# Patient Record
Sex: FEMALE | Race: WHITE | NOT HISPANIC OR LATINO | Employment: FULL TIME | URBAN - METROPOLITAN AREA
[De-identification: names, ages, dates, MRNs, and addresses within clinical notes are randomized per-mention and may not be internally consistent; named-entity substitution may affect disease eponyms.]

---

## 2017-04-20 ENCOUNTER — GENERIC CONVERSION - ENCOUNTER (OUTPATIENT)
Dept: OTHER | Facility: OTHER | Age: 57
End: 2017-04-20

## 2018-01-15 VITALS
DIASTOLIC BLOOD PRESSURE: 64 MMHG | HEART RATE: 68 BPM | TEMPERATURE: 98.4 F | BODY MASS INDEX: 24.32 KG/M2 | WEIGHT: 146 LBS | SYSTOLIC BLOOD PRESSURE: 114 MMHG | RESPIRATION RATE: 18 BRPM | HEIGHT: 65 IN | OXYGEN SATURATION: 98 %

## 2021-03-30 ENCOUNTER — OFFICE VISIT (OUTPATIENT)
Dept: PODIATRY | Facility: CLINIC | Age: 61
End: 2021-03-30
Payer: COMMERCIAL

## 2021-03-30 VITALS — HEIGHT: 66 IN | WEIGHT: 150 LBS | RESPIRATION RATE: 17 BRPM | BODY MASS INDEX: 24.11 KG/M2

## 2021-03-30 DIAGNOSIS — M21.962 ACQUIRED DEFORMITY OF LEFT FOOT: ICD-10-CM

## 2021-03-30 DIAGNOSIS — B35.1 ONYCHOMYCOSIS: ICD-10-CM

## 2021-03-30 DIAGNOSIS — Q82.8 KERATOSIS PALMARIS ET PLANTARIS: Primary | ICD-10-CM

## 2021-03-30 PROCEDURE — 99203 OFFICE O/P NEW LOW 30 MIN: CPT | Performed by: PODIATRIST

## 2021-03-30 PROCEDURE — 73620 X-RAY EXAM OF FOOT: CPT | Performed by: PODIATRIST

## 2021-03-30 RX ORDER — TERBINAFINE HYDROCHLORIDE 250 MG/1
250 TABLET ORAL DAILY
Qty: 30 TABLET | Refills: 0 | Status: SHIPPED | OUTPATIENT
Start: 2021-03-30 | End: 2021-04-29

## 2021-03-30 RX ORDER — LEVOTHYROXINE SODIUM 137 UG/1
TABLET ORAL
COMMUNITY

## 2021-03-30 RX ORDER — TERBINAFINE HYDROCHLORIDE 250 MG/1
250 TABLET ORAL DAILY
Qty: 30 TABLET | Refills: 0 | Status: SHIPPED | OUTPATIENT
Start: 2021-03-30 | End: 2021-03-30

## 2021-03-30 RX ORDER — THYROID,PORK 90 MG
TABLET ORAL
COMMUNITY
Start: 2021-02-08

## 2021-03-30 NOTE — PROGRESS NOTES
Assessment/Plan: cornified lesion proximal nail fold 4th left toe  Negative Guthrie sign  Deformity of toe  Mycosis of right hallux nail     plan  Foot exam performed  Patient educated on condition  X-rays taken  We will schedule excision of biopsy of lesion of left foot  We will start the patient on Lamisil         Diagnoses and all orders for this visit:    Keratosis palmaris et plantaris    Acquired deformity of left foot    Onychomycosis  -     terbinafine (LamISIL) 250 mg tablet; Take 1 tablet (250 mg total) by mouth daily    Other orders  -     levothyroxine (Synthroid) 137 mcg tablet; Take by mouth  -     [place] Thyroid 90 MG tablet          Subjective:  Patient has 2 complaints  She has a skin lesion on her 4th left toe  It has been there for quite some time  No history of trauma  She is also concerned with discoloration of right big toe  No Known Allergies      Current Outpatient Medications:     [place] Thyroid 90 MG tablet, , Disp: , Rfl:     levothyroxine (Synthroid) 137 mcg tablet, Take by mouth, Disp: , Rfl:     terbinafine (LamISIL) 250 mg tablet, Take 1 tablet (250 mg total) by mouth daily, Disp: 30 tablet, Rfl: 0    There is no problem list on file for this patient  Patient ID: Clement Garcia is a 64 y o  female  HPI    The following portions of the patient's history were reviewed and updated as appropriate:     family history is not on file  has no history on file for tobacco, alcohol, and drug  Vitals:    03/30/21 1355   Resp: 17       Review of Systems      Objective:  Patient's shoes and socks removed     Foot Exam    General  General Appearance: appears stated age and healthy   Orientation: alert and oriented to person, place, and time   Affect: appropriate   Gait: unimpaired       Right Foot/Ankle     Inspection and Palpation  Swelling: dorsum   Arch: pes planus  Hallux valgus: yes    Neurovascular  Dorsalis pedis: 3+  Posterior tibial: 3+      Left Foot/Ankle      Inspection and Palpation  Tenderness: bony tenderness   Swelling: dorsum   Arch: pes planus  Hallux valgus: yes  Skin Exam: skin changes; Neurovascular  Dorsalis pedis: 3+  Posterior tibial: 3+        Physical Exam  Vitals signs and nursing note reviewed  Constitutional:       Appearance: Normal appearance  Cardiovascular:      Rate and Rhythm: Normal rate and regular rhythm  Pulses:           Dorsalis pedis pulses are 3+ on the right side and 3+ on the left side  Posterior tibial pulses are 3+ on the right side and 3+ on the left side  Musculoskeletal:      Right foot: Bunion present  Left foot: Bony tenderness and bunion present  Skin:     Comments:   Right hallux demonstrates dried subungual hematoma early mycosis  Negative lysis  4th left toe in the area the proximal nail fold demonstrates a cornified skin lesion  Negative Guthrie sign noted  Negative pigmentation of nail  X-ray demonstrates no evidence of exostosis   Neurological:      Mental Status: She is alert  Psychiatric:         Mood and Affect: Mood normal          Behavior: Behavior normal          Thought Content:  Thought content normal          Judgment: Judgment normal

## 2021-04-02 DIAGNOSIS — B35.1 ONYCHOMYCOSIS: Primary | ICD-10-CM

## 2021-04-02 RX ORDER — TERBINAFINE HYDROCHLORIDE 250 MG/1
250 TABLET ORAL DAILY
Qty: 30 TABLET | Refills: 0 | Status: SHIPPED | OUTPATIENT
Start: 2021-04-02 | End: 2021-05-02

## 2021-04-12 ENCOUNTER — PROCEDURE VISIT (OUTPATIENT)
Dept: PODIATRY | Facility: CLINIC | Age: 61
End: 2021-04-12
Payer: COMMERCIAL

## 2021-04-12 VITALS
BODY MASS INDEX: 24.11 KG/M2 | SYSTOLIC BLOOD PRESSURE: 123 MMHG | HEART RATE: 81 BPM | DIASTOLIC BLOOD PRESSURE: 84 MMHG | HEIGHT: 66 IN | WEIGHT: 150 LBS | RESPIRATION RATE: 16 BRPM

## 2021-04-12 DIAGNOSIS — Q82.8 KERATOSIS PALMARIS ET PLANTARIS: Primary | ICD-10-CM

## 2021-04-12 PROCEDURE — 11103 TANGNTL BX SKIN EA SEP/ADDL: CPT | Performed by: PODIATRIST

## 2021-04-12 NOTE — PROGRESS NOTES
General  General Appearance: appears stated age and healthy   Orientation: alert and oriented to person, place, and time   Affect: appropriate   Gait: unimpaired         Right Foot/Ankle      Inspection and Palpation  Swelling: dorsum   Arch: pes planus  Hallux valgus: yes     Neurovascular  Dorsalis pedis: 3+  Posterior tibial: 3+        Left Foot/Ankle       Inspection and Palpation  Tenderness: bony tenderness   Swelling: dorsum   Arch: pes planus  Hallux valgus: yes  Skin Exam: skin changes;      Neurovascular  Dorsalis pedis: 3+  Posterior tibial: 3+           Physical Exam  Vitals signs and nursing note reviewed  Constitutional:       Appearance: Normal appearance  Cardiovascular:      Rate and Rhythm: Normal rate and regular rhythm  Pulses:           Dorsalis pedis pulses are 3+ on the right side and 3+ on the left side  Posterior tibial pulses are 3+ on the right side and 3+ on the left side  Musculoskeletal:      Right foot: Bunion present  Left foot: Bony tenderness and bunion present  Skin:  Proximal nail fold 4th left toe demonstrates cornified skin lesion  This is apparent keratosis  Plan  Excision and biopsy lesion to be performed  After achieving anesthesia with 3 cc 2% lidocaine plain as a digital nerve block of the 4th left toe, lesion was excised in total   Curette was used to perform this procedure  Base was cauterized with both phenol and silver nitrate  Prior to procedure patient was advised on condition  Consent form has been signed  Patient is aware the that nail deformity could occur after procedure  However she would like to proceed  She has been given written instruction for care and she will take Tylenol lee Pineda

## 2021-04-13 DIAGNOSIS — Z23 ENCOUNTER FOR IMMUNIZATION: ICD-10-CM

## 2021-04-26 ENCOUNTER — OFFICE VISIT (OUTPATIENT)
Dept: PODIATRY | Facility: CLINIC | Age: 61
End: 2021-04-26
Payer: COMMERCIAL

## 2021-04-26 VITALS
DIASTOLIC BLOOD PRESSURE: 84 MMHG | RESPIRATION RATE: 16 BRPM | HEIGHT: 66 IN | SYSTOLIC BLOOD PRESSURE: 123 MMHG | WEIGHT: 150 LBS | BODY MASS INDEX: 24.11 KG/M2

## 2021-04-26 DIAGNOSIS — M67.40 MUCOID CYST OF JOINT: ICD-10-CM

## 2021-04-26 DIAGNOSIS — M21.962 ACQUIRED DEFORMITY OF LEFT FOOT: ICD-10-CM

## 2021-04-26 DIAGNOSIS — M21.41 ACQUIRED FLAT FOOT, RIGHT: ICD-10-CM

## 2021-04-26 DIAGNOSIS — M21.42 ACQUIRED FLAT FOOT, LEFT: ICD-10-CM

## 2021-04-26 DIAGNOSIS — M21.961 ACQUIRED DEFORMITY OF RIGHT FOOT: Primary | ICD-10-CM

## 2021-04-26 DIAGNOSIS — B35.1 ONYCHOMYCOSIS: ICD-10-CM

## 2021-04-26 PROCEDURE — 99212 OFFICE O/P EST SF 10 MIN: CPT | Performed by: PODIATRIST

## 2021-04-26 PROCEDURE — L3000 FT INSERT UCB BERKELEY SHELL: HCPCS | Performed by: PODIATRIST

## 2021-04-26 NOTE — PROGRESS NOTES
Assessment/Plan: deformity of foot bilateral   Hammertoe formation  Acquired pes planus  Mucoid cyst right toe  Cornified lesion left toe  Awaiting biopsy results  Pain upon ambulation  Mycosis of nail,  Limb length discrepancy  plan  Foot exam performed  Patient educated on condition  Wound of full 4th left toe addressed  Patient remain on Lamisil  Mucoid cyst right toe debrided and cauterized  Patient's feet casted for custom molded orthotics         Diagnoses and all orders for this visit:    Acquired deformity of right foot    Acquired deformity of left foot    Acquired flat foot, right    Acquired flat foot, left    Mucoid cyst of joint          Subjective:   Patient is doing well status post procedure 4th left toe  She now has a concern about 4th right toe cyst   She is on Lamisil without problems  No Known Allergies      Current Outpatient Medications:     [place] Thyroid 90 MG tablet, , Disp: , Rfl:     levothyroxine (Synthroid) 137 mcg tablet, Take by mouth, Disp: , Rfl:     terbinafine (LamISIL) 250 mg tablet, Take 1 tablet (250 mg total) by mouth daily, Disp: 30 tablet, Rfl: 0    terbinafine (LamISIL) 250 mg tablet, Take 1 tablet (250 mg total) by mouth daily, Disp: 30 tablet, Rfl: 0    There is no problem list on file for this patient  Patient ID: Paul Magallanes is a 64 y o  female  HPI    The following portions of the patient's history were reviewed and updated as appropriate:     family history is not on file  has no history on file for tobacco, alcohol, and drug  Vitals:    04/26/21 1443   BP: 123/84   Resp: 16       Review of Systems      Objective:  Patient's shoes and socks removed     Foot ExamPhysical Exam         General  General Appearance: appears stated age and healthy   Orientation: alert and oriented to person, place, and time   Affect: appropriate   Gait: unimpaired         Right Foot/Ankle      Inspection and Palpation  Swelling: dorsum   Arch: pes planus  Hallux valgus: yes     Neurovascular  Dorsalis pedis: 3+  Posterior tibial: 3+        Left Foot/Ankle       Inspection and Palpation  Tenderness: bony tenderness   Swelling: dorsum   Arch: pes planus  Hallux valgus: yes  Skin Exam: skin changes;      Neurovascular  Dorsalis pedis: 3+  Posterior tibial: 3+           Physical Exam  Vitals signs and nursing note reviewed  Constitutional:       Appearance: Normal appearance  Cardiovascular:      Rate and Rhythm: Normal rate and regular rhythm  Pulses:           Dorsalis pedis pulses are 3+ on the right side and 3+ on the left side  Posterior tibial pulses are 3+ on the right side and 3+ on the left side  Musculoskeletal:      Right foot: Bunion present  Left foot: Bony tenderness and bunion present  Skin:     Comments:   Right hallux demonstrates dried subungual hematoma early mycosis  Negative lysis  4th left toe in the area the proximal nail fold demonstrates a cornified skin lesion  Negative Guthrie sign noted  Negative pigmentation of nail  X-ray demonstrates no evidence of exostosis    This area now is healing well status post biopsy  No evidence of cellulitis      DIPJ 4th right toe demonstrates small mucoid cyst   Incision and drainage done  Gel noted  No evidence of infection  Neurological:      Mental Status: She is alert  Psychiatric:         Mood and Affect: Mood normal          Behavior: Behavior normal          Thought Content:  Thought content normal          Judgment: Judgment normal

## 2021-04-27 ENCOUNTER — TELEPHONE (OUTPATIENT)
Dept: PODIATRY | Facility: CLINIC | Age: 61
End: 2021-04-27

## 2021-04-27 LAB
PATH REPORT.SITE OF ORIGIN SPEC: NORMAL
PAYMENT PROCEDURE: NORMAL
SL AMB .: NORMAL

## 2021-04-27 NOTE — TELEPHONE ENCOUNTER
Patient advised of results  She will read turn within 1 month for evaluation    She will watch for signs of infection

## 2021-05-27 ENCOUNTER — OFFICE VISIT (OUTPATIENT)
Dept: PODIATRY | Facility: CLINIC | Age: 61
End: 2021-05-27
Payer: COMMERCIAL

## 2021-05-27 VITALS — RESPIRATION RATE: 17 BRPM | WEIGHT: 150 LBS | HEIGHT: 66 IN | BODY MASS INDEX: 24.11 KG/M2

## 2021-05-27 DIAGNOSIS — M67.40 MUCOID CYST OF JOINT: Primary | ICD-10-CM

## 2021-05-27 DIAGNOSIS — B35.1 ONYCHOMYCOSIS: ICD-10-CM

## 2021-05-27 PROCEDURE — 99213 OFFICE O/P EST LOW 20 MIN: CPT | Performed by: PODIATRIST

## 2021-05-27 RX ORDER — TERBINAFINE HYDROCHLORIDE 250 MG/1
250 TABLET ORAL DAILY
Qty: 30 TABLET | Refills: 0 | Status: SHIPPED | OUTPATIENT
Start: 2021-05-27 | End: 2021-11-23

## 2021-05-27 NOTE — PROGRESS NOTES
Assessment/Plan:   pain upon ambulation  Mucoid cyst 4th right toe  Resolving mycosis of nail  Plan  Foot exam performed  Patient educated on condition  Right 4th toe lesion debrided  Silver nitrate applied  Patient remain on oral terbinafine  [facility] ordered  Diagnoses and all orders for this visit:    Mucoid cyst of joint    Onychomycosis  -     terbinafine (LamISIL) 250 mg tablet; Take 1 tablet (250 mg total) by mouth daily          Subjective:   Patient presents for evaluation  She is doing better  She is taking Lamisil without problem  She has a small cyst developing on her 4th right toe  No Known Allergies      Current Outpatient Medications:     [place] Thyroid 90 MG tablet, , Disp: , Rfl:     levothyroxine (Synthroid) 137 mcg tablet, Take by mouth, Disp: , Rfl:     terbinafine (LamISIL) 250 mg tablet, Take 1 tablet (250 mg total) by mouth daily, Disp: 30 tablet, Rfl: 0    There is no problem list on file for this patient  Patient ID: Alejandra Farmer is a 64 y o  female  HPI    The following portions of the patient's history were reviewed and updated as appropriate:     family history is not on file  has no history on file for tobacco, alcohol, and drug  Vitals:    05/27/21 1557   Resp: 17       Review of Systems      Objective:  Patient's shoes and socks removed     Foot ExamPhysical Exam         General  General Appearance: appears stated age and healthy   Orientation: alert and oriented to person, place, and time   Affect: appropriate   Gait: unimpaired         Right Foot/Ankle      Inspection and Palpation  Swelling: dorsum   Arch: pes planus  Hallux valgus: yes     Neurovascular  Dorsalis pedis: 3+  Posterior tibial: 3+        Left Foot/Ankle       Inspection and Palpation  Tenderness: bony tenderness   Swelling: dorsum   Arch: pes planus  Hallux valgus: yes  Skin Exam: skin changes;      Neurovascular  Dorsalis pedis: 3+  Posterior tibial: 3+           Physical Exam  Vitals signs and nursing note reviewed  Constitutional:       Appearance: Normal appearance  Cardiovascular:      Rate and Rhythm: Normal rate and regular rhythm       Pulses:           Dorsalis pedis pulses are 3+ on the right side and 3+ on the left side         Posterior tibial pulses are 3+ on the right side and 3+ on the left side  Musculoskeletal:      Right foot: Bunion present       Left foot: Bony tenderness and bunion present  Skin:     Comments:   Right hallux demonstrates dried subungual hematoma early mycosis   Negative lysis         DIPJ 4th right toe demonstrates small mucoid cyst   Incision and drainage done  Gel noted  No evidence of infection        Neurological:      Mental Status: She is alert  Psychiatric:         Mood and Affect: Mood normal          BehaviorAtlas Aravind         Thought Content:  Thought content normal          Judgment: Judgment normal

## 2021-10-05 ENCOUNTER — OFFICE VISIT (OUTPATIENT)
Dept: PODIATRY | Facility: CLINIC | Age: 61
End: 2021-10-05
Payer: COMMERCIAL

## 2021-10-05 VITALS — RESPIRATION RATE: 16 BRPM | HEIGHT: 66 IN | BODY MASS INDEX: 24.11 KG/M2 | WEIGHT: 150 LBS

## 2021-10-05 DIAGNOSIS — M21.961 ACQUIRED DEFORMITY OF BOTH FEET: ICD-10-CM

## 2021-10-05 DIAGNOSIS — B35.1 ONYCHOMYCOSIS: ICD-10-CM

## 2021-10-05 DIAGNOSIS — M67.40 MUCOID CYST OF JOINT: Primary | ICD-10-CM

## 2021-10-05 DIAGNOSIS — M21.962 ACQUIRED DEFORMITY OF BOTH FEET: ICD-10-CM

## 2021-10-05 PROCEDURE — 99213 OFFICE O/P EST LOW 20 MIN: CPT | Performed by: PODIATRIST

## 2021-10-06 LAB
ALBUMIN SERPL-MCNC: 4.6 G/DL (ref 3.8–4.9)
ALBUMIN/GLOB SERPL: 2.4 {RATIO} (ref 1.2–2.2)
ALP SERPL-CCNC: 59 IU/L (ref 44–121)
ALT SERPL-CCNC: 15 IU/L (ref 0–32)
AST SERPL-CCNC: 17 IU/L (ref 0–40)
BILIRUB SERPL-MCNC: 0.2 MG/DL (ref 0–1.2)
BUN SERPL-MCNC: 10 MG/DL (ref 6–24)
BUN/CREAT SERPL: 18 (ref 9–23)
CALCIUM SERPL-MCNC: 9.6 MG/DL (ref 8.7–10.2)
CHLORIDE SERPL-SCNC: 98 MMOL/L (ref 96–106)
CO2 SERPL-SCNC: 26 MMOL/L (ref 20–29)
CREAT SERPL-MCNC: 0.55 MG/DL (ref 0.57–1)
GLOBULIN SER-MCNC: 1.9 G/DL (ref 1.5–4.5)
GLUCOSE SERPL-MCNC: 100 MG/DL (ref 65–99)
POTASSIUM SERPL-SCNC: 4.3 MMOL/L (ref 3.5–5.2)
PROT SERPL-MCNC: 6.5 G/DL (ref 6–8.5)
SL AMB EGFR AFRICAN AMERICAN: 119 ML/MIN/1.73
SL AMB EGFR NON AFRICAN AMERICAN: 103 ML/MIN/1.73
SODIUM SERPL-SCNC: 136 MMOL/L (ref 134–144)

## 2021-11-23 DIAGNOSIS — B35.1 ONYCHOMYCOSIS: ICD-10-CM

## 2021-11-23 RX ORDER — TERBINAFINE HYDROCHLORIDE 250 MG/1
TABLET ORAL
Qty: 24 TABLET | Refills: 0 | Status: SHIPPED | OUTPATIENT
Start: 2021-11-23 | End: 2021-11-23 | Stop reason: SDUPTHER

## 2021-11-23 RX ORDER — TERBINAFINE HYDROCHLORIDE 250 MG/1
250 TABLET ORAL DAILY
Qty: 90 TABLET | Refills: 0 | Status: SHIPPED | OUTPATIENT
Start: 2021-11-23 | End: 2021-12-07 | Stop reason: SDUPTHER

## 2021-12-07 ENCOUNTER — OFFICE VISIT (OUTPATIENT)
Dept: PODIATRY | Facility: CLINIC | Age: 61
End: 2021-12-07
Payer: COMMERCIAL

## 2021-12-07 ENCOUNTER — IMMUNIZATIONS (OUTPATIENT)
Dept: FAMILY MEDICINE CLINIC | Facility: HOSPITAL | Age: 61
End: 2021-12-07

## 2021-12-07 VITALS — BODY MASS INDEX: 24.11 KG/M2 | HEIGHT: 66 IN | WEIGHT: 150 LBS | RESPIRATION RATE: 17 BRPM

## 2021-12-07 DIAGNOSIS — B35.1 ONYCHOMYCOSIS: ICD-10-CM

## 2021-12-07 DIAGNOSIS — M21.962 ACQUIRED DEFORMITY OF BOTH FEET: ICD-10-CM

## 2021-12-07 DIAGNOSIS — M21.961 ACQUIRED DEFORMITY OF BOTH FEET: ICD-10-CM

## 2021-12-07 DIAGNOSIS — M21.961 ACQUIRED DEFORMITY OF RIGHT FOOT: ICD-10-CM

## 2021-12-07 DIAGNOSIS — Z23 ENCOUNTER FOR IMMUNIZATION: Primary | ICD-10-CM

## 2021-12-07 DIAGNOSIS — M67.40 MUCOID CYST OF JOINT: Primary | ICD-10-CM

## 2021-12-07 PROCEDURE — 91306 COVID-19 MODERNA VACC 0.25 ML BOOSTER: CPT

## 2021-12-07 PROCEDURE — 99213 OFFICE O/P EST LOW 20 MIN: CPT | Performed by: PODIATRIST

## 2021-12-07 PROCEDURE — 0064A COVID-19 MODERNA VACC 0.25 ML BOOSTER: CPT

## 2021-12-07 RX ORDER — TERBINAFINE HYDROCHLORIDE 250 MG/1
250 TABLET ORAL DAILY
Qty: 90 TABLET | Refills: 0 | Status: SHIPPED | OUTPATIENT
Start: 2021-12-07 | End: 2021-12-07 | Stop reason: SDUPTHER

## 2021-12-07 RX ORDER — TERBINAFINE HYDROCHLORIDE 250 MG/1
250 TABLET ORAL DAILY
Qty: 30 TABLET | Refills: 0 | Status: SHIPPED | OUTPATIENT
Start: 2021-12-07 | End: 2022-01-06

## 2022-02-08 ENCOUNTER — OFFICE VISIT (OUTPATIENT)
Dept: PODIATRY | Facility: CLINIC | Age: 62
End: 2022-02-08
Payer: COMMERCIAL

## 2022-02-08 VITALS — BODY MASS INDEX: 24.11 KG/M2 | WEIGHT: 150 LBS | HEIGHT: 66 IN | RESPIRATION RATE: 16 BRPM

## 2022-02-08 DIAGNOSIS — M67.40 MUCOID CYST OF JOINT: Primary | ICD-10-CM

## 2022-02-08 DIAGNOSIS — B35.1 ONYCHOMYCOSIS: ICD-10-CM

## 2022-02-08 DIAGNOSIS — M21.962 ACQUIRED DEFORMITY OF BOTH FEET: ICD-10-CM

## 2022-02-08 DIAGNOSIS — M21.961 ACQUIRED DEFORMITY OF RIGHT FOOT: ICD-10-CM

## 2022-02-08 DIAGNOSIS — M21.961 ACQUIRED DEFORMITY OF BOTH FEET: ICD-10-CM

## 2022-02-08 PROCEDURE — 99213 OFFICE O/P EST LOW 20 MIN: CPT | Performed by: PODIATRIST

## 2022-02-08 NOTE — PROGRESS NOTES
Assessment/Plan:   pain upon ambulation   Mucoid cyst 4th right and left toe    mycosis of nail  Acquired deformity of foot       Plan   Foot exam performed   Patient educated on condition   Left and  Right 4th toe lesion debrided   Gentian violet applied  Silver nitrate applied   Patient may need surgical excision of lesions   CMP ordered    refill of Lamisil ordered         Diagnoses and all orders for this visit:     Mucoid cyst of joint , 4th toe bilateral      Onychomycosis      acquired deformity of foot bilateral                Subjective:   Patient presents for evaluation   She is doing better  Iris Rodriguescecille is taking Lamisil without problem   She has a small cyst developing on her 4th right toe      No Known Allergies        Current Outpatient Medications:     [place] Thyroid 90 MG tablet, , Disp: , Rfl:     levothyroxine (Synthroid) 137 mcg tablet, Take by mouth, Disp: , Rfl:     terbinafine (LamISIL) 250 mg tablet, Take 1 tablet (250 mg total) by mouth daily, Disp: 30 tablet, Rfl: 0     There is no problem list on file for this patient             Patient ID: Florina Storm is a 61 y  o  female      HPI     The following portions of the patient's history were reviewed and updated as appropriate:      family history is not on file        has no history on file for tobacco, alcohol, and drug         Objective:  Patient's shoes removed  By patient          Foot ExamPhysical Exam          General  General Appearance: appears stated age and healthy   Orientation: alert and oriented to person, place, and time   Affect: appropriate   Gait: unimpaired         Right Foot/Ankle      Inspection and Palpation  Swelling: dorsum   Arch: pes planus  Hallux valgus: yes     Neurovascular  Dorsalis pedis: 3+  Posterior tibial: 3+        Left Foot/Ankle       Inspection and Palpation  Tenderness: bony tenderness   Swelling: dorsum   Arch: pes planus  Hallux valgus: yes  Skin Exam: skin changes;      Neurovascular  Dorsalis pedis: 3+  Posterior tibial: 3+           Physical Exam  Vitals signs and nursing note reviewed  Constitutional:       Appearance: Normal appearance  Cardiovascular:      Rate and Rhythm: Normal rate and regular rhythm       Pulses:           Dorsalis pedis pulses are 3+ on the right side and 3+ on the left side         Posterior tibial pulses are 3+ on the right side and 3+ on the left side  Musculoskeletal:      Right foot: Bunion present       Left foot: Bony tenderness and bunion present  Skin:     Comments:   Right hallux demonstrates dried subungual hematoma early mycosis   Negative lysis     4Th left toe also demonstrates similar incision at the DIPJ       DIPJ 4th right toe demonstrates small mucoid cyst   Incision and drainage done   Gel noted   No evidence of infection        Neurological:      Mental Status: She is alert  Psychiatric:         Mood and Affect: Mood normal          BehaviorNathanael Trammell         Thought Content:  Thought content normal          Judgment: Judgment normal

## 2022-04-12 ENCOUNTER — OFFICE VISIT (OUTPATIENT)
Dept: PODIATRY | Facility: CLINIC | Age: 62
End: 2022-04-12
Payer: COMMERCIAL

## 2022-04-12 VITALS — BODY MASS INDEX: 24.11 KG/M2 | HEIGHT: 66 IN | WEIGHT: 150 LBS | RESPIRATION RATE: 16 BRPM

## 2022-04-12 DIAGNOSIS — M67.40 MUCOID CYST OF JOINT: Primary | ICD-10-CM

## 2022-04-12 DIAGNOSIS — B35.1 ONYCHOMYCOSIS: ICD-10-CM

## 2022-04-12 DIAGNOSIS — M21.961 ACQUIRED DEFORMITY OF RIGHT FOOT: ICD-10-CM

## 2022-04-12 DIAGNOSIS — M21.962 ACQUIRED DEFORMITY OF LEFT FOOT: ICD-10-CM

## 2022-04-12 PROCEDURE — 99213 OFFICE O/P EST LOW 20 MIN: CPT | Performed by: PODIATRIST

## 2022-04-12 NOTE — PROGRESS NOTES
Assessment/Plan:   pain upon ambulation   Mucoid cyst 4th right and left toe    mycosis of nail  Acquired deformity of foot       Plan   Foot exam performed   Patient educated on condition   Left and  Right 4th toe lesion debrided   Gentian violet applied  Silver nitrate applied   Patient may need surgical excision of lesions            Diagnoses and all orders for this visit:     Mucoid cyst of joint , 4th toe bilateral      Onychomycosis      acquired deformity of foot bilateral                Subjective:   Patient presents for evaluation   She is doing better  Gearldine Finders is taking Lamisil without problem   She has a small cyst developing on her 4th right toe      No Known Allergies        Current Outpatient Medications:     [place] Thyroid 90 MG tablet, , Disp: , Rfl:     levothyroxine (Synthroid) 137 mcg tablet, Take by mouth, Disp: , Rfl:     terbinafine (LamISIL) 250 mg tablet, Take 1 tablet (250 mg total) by mouth daily, Disp: 30 tablet, Rfl: 0     There is no problem list on file for this patient             Patient ID: Florina Storm is a 61 y  o  female      HPI     The following portions of the patient's history were reviewed and updated as appropriate:      family history is not on file        has no history on file for tobacco, alcohol, and drug         Objective:  Patient's shoes removed  By patient          Foot ExamPhysical Exam          General  General Appearance: appears stated age and healthy   Orientation: alert and oriented to person, place, and time   Affect: appropriate   Gait: unimpaired         Right Foot/Ankle      Inspection and Palpation  Swelling: dorsum   Arch: pes planus  Hallux valgus: yes     Neurovascular  Dorsalis pedis: 3+  Posterior tibial: 3+        Left Foot/Ankle       Inspection and Palpation  Tenderness: bony tenderness   Swelling: dorsum   Arch: pes planus  Hallux valgus: yes  Skin Exam: skin changes;      Neurovascular  Dorsalis pedis: 3+  Posterior tibial: 3+           Physical Exam  Vitals signs and nursing note reviewed  Constitutional:       Appearance: Normal appearance  Cardiovascular:      Rate and Rhythm: Normal rate and regular rhythm       Pulses:           Dorsalis pedis pulses are 3+ on the right side and 3+ on the left side         Posterior tibial pulses are 3+ on the right side and 3+ on the left side  Musculoskeletal:      Right foot: Bunion present       Left foot: Bony tenderness and bunion present  Skin:     Comments:   Right hallux demonstrates dried subungual hematoma early mycosis   Negative lysis     4Th left toe also demonstrates similar lesion at the DIPJ       DIPJ 4th right toe demonstrates small mucoid cyst   Incision and drainage done   Gel noted   No evidence of infection        Neurological:      Mental Status: She is alert  Psychiatric:         Mood and Affect: Mood normal          BehaviorThomes Rear         Thought Content:  Thought content normal          Judgment: Judgment normal

## 2022-06-14 ENCOUNTER — OFFICE VISIT (OUTPATIENT)
Dept: PODIATRY | Facility: CLINIC | Age: 62
End: 2022-06-14
Payer: COMMERCIAL

## 2022-06-14 VITALS — HEIGHT: 66 IN | WEIGHT: 150 LBS | RESPIRATION RATE: 17 BRPM | BODY MASS INDEX: 24.11 KG/M2

## 2022-06-14 DIAGNOSIS — M21.962 ACQUIRED DEFORMITY OF LEFT FOOT: ICD-10-CM

## 2022-06-14 DIAGNOSIS — M21.961 ACQUIRED DEFORMITY OF RIGHT FOOT: ICD-10-CM

## 2022-06-14 DIAGNOSIS — M67.40 MUCOID CYST OF JOINT: Primary | ICD-10-CM

## 2022-06-14 PROCEDURE — 99213 OFFICE O/P EST LOW 20 MIN: CPT | Performed by: PODIATRIST

## 2022-06-14 RX ORDER — LEVOTHYROXINE SODIUM 0.03 MG/1
TABLET ORAL
COMMUNITY

## 2022-06-14 NOTE — PROGRESS NOTES
Assessment/Plan:   pain upon ambulation   Mucoid cyst 4th right and left toe    mycosis of nail  Acquired deformity of foot       Plan   Foot exam performed   Patient educated on condition   Left and  Right 4th toe lesion debrided   Phenol applied   Patient may need surgical excision of lesions            Diagnoses and all orders for this visit:     Mucoid cyst of joint , 4th toe bilateral      Onychomycosis      acquired deformity of foot bilateral                Subjective:   Patient presents for evaluation   She is doing better  Marisol Abdi is taking Lamisil without problem   She has a small cyst developing on her 4th right toe      No Known Allergies        Current Outpatient Medications:     [place] Thyroid 90 MG tablet, , Disp: , Rfl:     levothyroxine (Synthroid) 137 mcg tablet, Take by mouth, Disp: , Rfl:     terbinafine (LamISIL) 250 mg tablet, Take 1 tablet (250 mg total) by mouth daily, Disp: 30 tablet, Rfl: 0     There is no problem list on file for this patient             Patient ID: Florina Storm is a 61 y  o  female      HPI     The following portions of the patient's history were reviewed and updated as appropriate:      family history is not on file        has no history on file for tobacco, alcohol, and drug         Objective:  Patient's shoes removed  By patient          Foot ExamPhysical Exam          General  General Appearance: appears stated age and healthy   Orientation: alert and oriented to person, place, and time   Affect: appropriate   Gait: unimpaired         Right Foot/Ankle      Inspection and Palpation  Swelling: dorsum   Arch: pes planus  Hallux valgus: yes     Neurovascular  Dorsalis pedis: 3+  Posterior tibial: 3+        Left Foot/Ankle       Inspection and Palpation  Tenderness: bony tenderness   Swelling: dorsum   Arch: pes planus  Hallux valgus: yes  Skin Exam: skin changes;      Neurovascular  Dorsalis pedis: 3+  Posterior tibial: 3+           Physical Exam  Vitals signs and nursing note reviewed  Constitutional:       Appearance: Normal appearance  Cardiovascular:      Rate and Rhythm: Normal rate and regular rhythm       Pulses:           Dorsalis pedis pulses are 3+ on the right side and 3+ on the left side         Posterior tibial pulses are 3+ on the right side and 3+ on the left side  Musculoskeletal:      Right foot: Bunion present       Left foot: Bony tenderness and bunion present  Skin:     Comments:   Right hallux demonstrates dried subungual hematoma early mycosis   Negative lysis     4Th left toe also demonstrates similar lesion at the DIPJ       DIPJ 4th right toe demonstrates small mucoid cyst   Incision and drainage done   Gel noted   No evidence of infection        Neurological:      Mental Status: She is alert  Psychiatric:         Mood and Affect: Mood normal          BehaviorDenjordan Palma         Thought Content:  Thought content normal          Judgment: Judgment normal

## 2022-07-21 ENCOUNTER — EVALUATION (OUTPATIENT)
Dept: PHYSICAL THERAPY | Facility: CLINIC | Age: 62
End: 2022-07-21
Payer: COMMERCIAL

## 2022-07-21 DIAGNOSIS — R32 INCONTINENCE IN FEMALE: Primary | ICD-10-CM

## 2022-07-21 PROCEDURE — 97161 PT EVAL LOW COMPLEX 20 MIN: CPT

## 2022-07-21 NOTE — PROGRESS NOTES
PT Evaluation     Today's date: 2022  Patient name: Richar Allison  : 1960  MRN: [id number]  Referring provider: self-referral  Dx:   Encounter Diagnosis     ICD-10-CM    1  Incontinence in female  R32        Start Time: 0  Stop Time: [phone]  Total time in clinic (min): 60 minutes    Assessment  Assessment details:   CASE SUMMARY:   Richar Allison is a 63y o  year old female who reports onset of symptoms ~  Urinary stress incontinence  Patient describes symptoms as: annoying and embarassing  Symptoms are : intermittent  [address] is limited in the following activities: walking  PMHx includes: See chart for full details with medications  Patient's clinical presentation is consistent with their referring diagnosis of: Incontinence in female  (primary encounter diagnosis)    POC was discussed and agreed upon with patient  Patient was educated on: pelvic floor anatomy, Importance of body mechanics and ergonomics in regards to protecting against activities which increase IAP and pressure,  and PT exam and course of treatment  Patient vocalized a good understanding of  POC and HEP issued  Patient would benefit from skilled physical therapy services to address their aforementioned functional limitations and progress towards prior level of function and independence with home exercise program       Pelvic floor verbal consent and written consent signed and in chart  Patient deferred second person in room: YES/NO         Impairments: abnormal muscle firing, abnormal muscle tone, activity intolerance, impaired physical strength, lacks appropriate home exercise program, poor posture  and poor body mechanics  Barriers to therapy: 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  Understanding of Dx/Px/POC: good   Prognosis: good    Goals  STG (3 weeks)  1  Become proficient in simple HEP  2  Demonstrate ability to perform kegels/ down training  3  Demonstrate ability to perform bladder log  4   State a functional improvement of 25%    LTG (8 weeks)  1  Patient will be proficient in extensive HEP  2  Patient will improve FOTO score by 10 points   3  Patient will demonstrate ability for sustained kegels/ down training  4   Patient will report 75% functional improvement       Plan  Patient would benefit from: skilled physical therapy  Planned modality interventions: biofeedback, cryotherapy, TENS, ultrasound and hydrotherapy  Planned therapy interventions: joint mobilization, manual therapy, neuromuscular re-education, patient education, postural training, strengthening, stretching, therapeutic activities, therapeutic exercise, functional ROM exercises, flexibility, graded activity and home exercise program  Frequency: 1x week  Duration in weeks: 8  Treatment plan discussed with: patient        PT Pelvic Floor Subjective:   History of Present Illness:   Date of onset: 6/1/2021          Recurrent probem    Quality of life: good    Social Support:     Lives in:  Multiple-level home    Lives with:  Parents    Relationship status: never     Work status: retired    Life stress level: 7    Life stress severity: moderate    History of Depression: no  Hand dominance:  Right  Diet and Exercise:    Diet:balanced nutrition    Exercise type: weight training and walking    Exercise frequency: daily    Walks dogs daily-30-40 min  OB/ gyn History    Gestational History:     Prior Pregnancy: Yes      Number of prior pregnancies: 1    Number of term pregnancies: 0    Number of vaginal deliveries: 0    Number of caesarian sections: 0  no delivery complications    Menstrual History:    Date of last menstrual period: 7/1/2012  hormone replacement therapy    Duration of hormone replacement therapy: 5 years  Dr Adry Lloyd- bioidentical hormone replacement  Bladder Function:     Voiding Difficulties comments:     Voiding frequency: every 1-2 hours    Urinary leakage: urine leakage    Urinary leakage aggravated by: exercise Nocturia (episodes per night): 1    Painful urination: No      Fluid Intake Type:  Alcohol, coffee and water    Intake (ounces): Water: 70, Coffee: 30,   Incontinence Management:     Pads/Diaper Use:  Day    Pads/Diapers Additional Comments: uses a moderate sized pad when walking panty shield during the day  Bowel Function:     Bowel Function comments:  Normal bowel function     Bowel frequency: daily    [place] Stool Scale: type 3    Stool softener use: no stool softeners    Enema use: no enema  Sexual Function:     Sexually Active:  Sexually active and single partner    Pain during intercourse: No      pain does not cause abstinence  Pain:     No pain reported by patient  Diagnostic Tests:     None    Treatments:     Previous treatment:  Physical therapy  Patient Goals:     Patient goals for therapy:  Improved bladder or bowel function, improved quality of life and return to sport/leisure activities    Other patient goals:  Get pelvic floor to do what it is supposed to do      Objective   Pelvic Floor Exam     Diastatis   Diastasis recti present? no    Skin inspection:   no scars present  General Perineum Exam:   perineum intact     Negative for no pelvic organ prolapse at rest    Visual Inspection of Perineum:   Excursion of perineal body in cephalad direction with contraction of pelvic floor muscles (PFM): good  Cotton swab test: non-tender  Sphincter Tone Resting: normal  Sphincter Tone Squeeze: normal  Sensation: intact  Tenderness: unprovoked    Pelvic Organ Prolapse   no pelvic organ prolapse  Position: hook-lyingno pelvic organ prolapse at rest  With bearing down: none    Pelvic Floor Muscle Exam     Palpation   No tenderness on right in the bulbospongiosus, ischiocavernosus, super transverse perineal, puborectalis, pubococcygeus, iliococcygeus, coccygeus, obturator internus and periurethral  No tenderness on left in the bulbospongiosus, ischiocavernosus, super transverse perineal, puborectalis, pubococcygeus, iliococcygeus, coccygeus, obturator internus and periurethral    Muscle Contraction: well isolated  Breathing pattern with contraction: within normal limits  Pelvic floor muscle relaxation is incomplete  50% pelvic floor relaxation  4 seconds required for complete relaxation       PERFECT Score   Power right: 3/5  Power left: 3/5  Endurance (seconds to max): 5  Repetitions (before fatigue): 3  Fast flicks (in 10 seconds): 4    SMEG Biofeedback   to be assessed next treatment               Precautions: standard      Manuals                                                                 Neuro Re-Ed                                                                                                        Ther Ex                                                                                                                     Ther Activity                                       Gait Training                                       Modalities

## 2022-07-21 NOTE — LETTER
2022    Rita Carreno MD  [address], [address] [place] [address]    Patient: Elaine Ying   YOB: 1960   Date of Visit: 2022     Encounter Diagnosis     ICD-10-CM    1  Incontinence in female  R32        Dear Dr Ayde Hernandez Recipients: Thank you for your recent referral of Elaine Ying  Please review the attached evaluation summary from Florina's recent visit  Please verify that you agree with the plan of care by signing the attached order  If you have any questions or concerns, please do not hesitate to call  I sincerely appreciate the opportunity to share in the care of one of your patients and hope to have another opportunity to work with you in the near future  Sincerely,    Kristopher Kidd, PT      Referring Provider:      I certify that I have read the below Plan of Care and certify the need for these services furnished under this plan of treatment while under my care  Rita Carreno MD  [address], [address] [place] [address]  Via Fax: [phone]          PT Evaluation     Today's date: 2022  Patient name: Elaine Ying  : 1960  MRN: [id number]  Referring provider: No ref  provider found  Dx: No diagnosis found  Assessment  Assessment details:   CASE SUMMARY:   Elaine Ying is a 63y o  year old female who reports onset of symptoms ~  Urinary stress incontinence  Patient describes symptoms as: annoying and embarassing  Symptoms are : intermittent  [place] is limited in the following activities: walking  PMHx includes: See chart for full details with medications  Patient's clinical presentation is consistent with their referring diagnosis of: Incontinence in female  (primary encounter diagnosis)    POC was discussed and agreed upon with patient    Patient was educated on: pelvic floor anatomy, Importance of body mechanics and ergonomics in regards to protecting against activities which increase IAP and pressure, and PT exam and course of treatment  Patient vocalized a good understanding of  POC and HEP issued  Patient would benefit from skilled physical therapy services to address their aforementioned functional limitations and progress towards prior level of function and independence with home exercise program       Pelvic floor verbal consent and written consent signed and in chart  Patient deferred second person in room: YES/NO         Impairments: abnormal muscle firing, abnormal muscle tone, activity intolerance, impaired physical strength, lacks appropriate home exercise program, poor posture  and poor body mechanics  Barriers to therapy: 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  Understanding of Dx/Px/POC: good   Prognosis: good    Goals  STG (3 weeks)  1  Become proficient in simple HEP  2  Demonstrate ability to perform kegels/ down training  3  Demonstrate ability to perform bladder log  4  State a functional improvement of 25%    LTG (8 weeks)  1  Patient will be proficient in extensive HEP  2  Patient will improve FOTO score by 10 points   3  Patient will demonstrate ability for sustained kegels/ down training  4   Patient will report 75% functional improvement       Plan  Patient would benefit from: skilled physical therapy  Planned modality interventions: biofeedback, cryotherapy, TENS, ultrasound and hydrotherapy  Planned therapy interventions: joint mobilization, manual therapy, neuromuscular re-education, patient education, postural training, strengthening, stretching, therapeutic activities, therapeutic exercise, functional ROM exercises, flexibility, graded activity and home exercise program  Frequency: 1x week  Duration in weeks: 8  Treatment plan discussed with: patient        PT Pelvic Floor Subjective:   History of Present Illness:   Date of onset: 6/1/2021          Recurrent probem    Quality of life: good    Social Support:     Lives in:  Multiple-level home    Lives with:  Parents    Relationship status: never     Work status: retired    Life stress level: 7    Life stress severity: moderate    History of Depression: no  Hand dominance:  Right  Diet and Exercise:    Diet:balanced nutrition    Exercise type: weight training and walking    Exercise frequency: daily    Walks dogs daily-30-40 min  OB/ gyn History    Gestational History:     Prior Pregnancy: Yes      Number of prior pregnancies: 1    Number of term pregnancies: 0    Number of vaginal deliveries: 0    Number of caesarian sections: 0  no delivery complications    Menstrual History:    Date of last menstrual period: 7/1/2012  hormone replacement therapy    Duration of hormone replacement therapy: 5 years  Dr Durga Warren hormone replacement  Bladder Function:     Voiding Difficulties comments:     Voiding frequency: every 1-2 hours    Urinary leakage: urine leakage    Urinary leakage aggravated by: exercise    Nocturia (episodes per night): 1    Painful urination: No      Fluid Intake Type:  Alcohol, coffee and water    Intake (ounces): Water: 70, Coffee: 30,   Incontinence Management:     Pads/Diaper Use:  Day    Pads/Diapers Additional Comments: uses a moderate sized pad when walking panty shield during the day  Bowel Function:     Bowel Function comments:  Normal bowel function     Bowel frequency: daily    [place] Stool Scale: type 3    Stool softener use: no stool softeners    Enema use: no enema  Sexual Function:     Sexually Active:  Sexually active and single partner    Pain during intercourse: No      pain does not cause abstinence  Pain:     No pain reported by patient    Diagnostic Tests:     None    Treatments:     Previous treatment:  Physical therapy  Patient Goals:     Patient goals for therapy:  Improved bladder or bowel function, improved quality of life and return to sport/leisure activities    Other patient goals:  Get pelvic floor to do what it is supposed to do      Objective   Pelvic Floor Exam Diastatis   Diastasis recti present? no    Skin inspection:   no scars present  General Perineum Exam:   perineum intact  Negative for no pelvic organ prolapse at rest    Visual Inspection of Perineum:   Excursion of perineal body in cephalad direction with contraction of pelvic floor muscles (PFM): good  [place] swab test: non-tender  Sphincter Tone Resting: normal  Sphincter Tone Squeeze: normal  Sensation: intact  Tenderness: unprovoked    Pelvic Organ Prolapse   no pelvic organ prolapse  Position: hook-lyingno pelvic organ prolapse at rest  With bearing down: none    Pelvic Floor Muscle Exam     Palpation   No tenderness on right in the bulbospongiosus, ischiocavernosus, super transverse perineal, puborectalis, pubococcygeus, iliococcygeus, coccygeus, obturator internus and periurethral  No tenderness on left in the bulbospongiosus, ischiocavernosus, super transverse perineal, puborectalis, pubococcygeus, iliococcygeus, coccygeus, obturator internus and periurethral    Muscle Contraction: well isolated  Breathing pattern with contraction: within normal limits  Pelvic floor muscle relaxation is incomplete  50% pelvic floor relaxation  4 seconds required for complete relaxation       PERFECT Score   Power right: 3/5  Power left: 3/5  Endurance (seconds to max): 5  Repetitions (before fatigue): 3  Fast flicks (in 10 seconds): 4    SMEG Biofeedback   to be assessed next treatment               Precautions: standard      Manuals                                                                 Neuro Re-Ed                                                                                                        Ther Ex                                                                                                                     Ther Activity                                       Gait Training                                       Modalities

## 2022-07-26 ENCOUNTER — APPOINTMENT (OUTPATIENT)
Dept: PHYSICAL THERAPY | Facility: CLINIC | Age: 62
End: 2022-07-26
Payer: COMMERCIAL

## 2022-08-16 ENCOUNTER — OFFICE VISIT (OUTPATIENT)
Dept: PODIATRY | Facility: CLINIC | Age: 62
End: 2022-08-16
Payer: COMMERCIAL

## 2022-08-16 VITALS — RESPIRATION RATE: 17 BRPM | BODY MASS INDEX: 24.11 KG/M2 | WEIGHT: 150 LBS | HEIGHT: 66 IN

## 2022-08-16 DIAGNOSIS — M67.40 MUCOID CYST OF JOINT: Primary | ICD-10-CM

## 2022-08-16 DIAGNOSIS — M21.961 ACQUIRED DEFORMITY OF RIGHT FOOT: ICD-10-CM

## 2022-08-16 PROCEDURE — 99213 OFFICE O/P EST LOW 20 MIN: CPT | Performed by: PODIATRIST

## 2022-08-16 NOTE — PROGRESS NOTES
Assessment/Plan:   pain upon ambulation   Mucoid cyst 4th right and left toe    mycosis of nail  Acquired deformity of foot       Plan   Foot exam performed   Patient educated on condition   Right 4th toe lesion debrided   Phenol applied   Patient may need surgical excision of lesions            Diagnoses and all orders for this visit:     Mucoid cyst of joint , 4th right toe     Onychomycosis      acquired deformity of foot bilateral                Subjective:   Patient presents for evaluation   She is doing better  Memo Score is taking Lamisil without problem   She has a small cyst developing on her 4th right toe      No Known Allergies        Current Outpatient Medications:     [place] Thyroid 90 MG tablet, , Disp: , Rfl:     levothyroxine (Synthroid) 137 mcg tablet, Take by mouth, Disp: , Rfl:     terbinafine (LamISIL) 250 mg tablet, Take 1 tablet (250 mg total) by mouth daily, Disp: 30 tablet, Rfl: 0     There is no problem list on file for this patient             Patient ID: Florina Storm is a 61 y  o  female      HPI     The following portions of the patient's history were reviewed and updated as appropriate:      family history is not on file        has no history on file for tobacco, alcohol, and drug         Objective:  Patient's shoes removed  By patient          Foot ExamPhysical Exam          General  General Appearance: appears stated age and healthy   Orientation: alert and oriented to person, place, and time   Affect: appropriate   Gait: unimpaired         Right Foot/Ankle      Inspection and Palpation  Swelling: dorsum   Arch: pes planus  Hallux valgus: yes     Neurovascular  Dorsalis pedis: 3+  Posterior tibial: 3+        Left Foot/Ankle       Inspection and Palpation  Tenderness: bony tenderness   Swelling: dorsum   Arch: pes planus  Hallux valgus: yes  Skin Exam: skin changes;      Neurovascular  Dorsalis pedis: 3+  Posterior tibial: 3+           Physical Exam  Vitals signs and nursing note reviewed  Constitutional:       Appearance: Normal appearance  Cardiovascular:      Rate and Rhythm: Normal rate and regular rhythm       Pulses:           Dorsalis pedis pulses are 3+ on the right side and 3+ on the left side         Posterior tibial pulses are 3+ on the right side and 3+ on the left side  Musculoskeletal:      Right foot: Bunion present       Left foot: Bony tenderness and bunion present  Skin:     Comments:      DIPJ 4th right toe demonstrates small mucoid cyst   Incision and drainage done   Gel noted   No evidence of infection        Neurological:      Mental Status: She is alert  Psychiatric:         Mood and Affect: Mood normal          BehaviorAleta Speck         Thought Content:  Thought content normal          Judgment: Judgment normal

## 2022-10-12 ENCOUNTER — HOSPITAL ENCOUNTER (EMERGENCY)
Facility: HOSPITAL | Age: 62
Discharge: HOME | End: 2022-10-12
Attending: EMERGENCY MEDICINE
Payer: COMMERCIAL

## 2022-10-12 ENCOUNTER — APPOINTMENT (OUTPATIENT)
Dept: RADIOLOGY | Facility: HOSPITAL | Age: 62
End: 2022-10-12
Payer: COMMERCIAL

## 2022-10-12 VITALS
WEIGHT: 145 LBS | DIASTOLIC BLOOD PRESSURE: 80 MMHG | OXYGEN SATURATION: 100 % | SYSTOLIC BLOOD PRESSURE: 118 MMHG | TEMPERATURE: 97.1 F | HEART RATE: 82 BPM | BODY MASS INDEX: 23.4 KG/M2 | RESPIRATION RATE: 18 BRPM

## 2022-10-12 DIAGNOSIS — S30.0XXA CONTUSION OF SACRUM, INITIAL ENCOUNTER: Primary | ICD-10-CM

## 2022-10-12 PROCEDURE — 99283 EMERGENCY DEPT VISIT LOW MDM: CPT

## 2022-10-12 PROCEDURE — 99282 EMERGENCY DEPT VISIT SF MDM: CPT | Performed by: PHYSICIAN ASSISTANT

## 2022-10-12 PROCEDURE — 72220 X-RAY EXAM SACRUM TAILBONE: CPT

## 2022-10-12 RX ORDER — IBUPROFEN 400 MG/1
400 TABLET ORAL ONCE
Status: COMPLETED | OUTPATIENT
Start: 2022-10-12 | End: 2022-10-12

## 2022-10-12 RX ADMIN — IBUPROFEN 400 MG: 400 TABLET, FILM COATED ORAL at 11:32

## 2022-10-12 NOTE — ED PROVIDER NOTES
History  Chief Complaint   Patient presents with   • Tailbone Pain     Patient fell playing with dog  Has pain in her tailbone  History of osteoporosis  Patient states she took Tylenol before she came here today  She also applied ice to her tailbone  Patient is a 61-year-old white female with history of osteoporosis who reports sacral pain after she tripped over her dog 6:00 p m  yesterday  States she landed on concrete patio in sitting position  No head trauma or LOC  No neck pain or other back pain  No extremity injury  No extremity numbness or tingling  No other complaints  States she took Tylenol prior to arrival          Prior to Admission Medications   Prescriptions Last Dose Informant Patient Reported? Taking? [place] Thyroid 90 MG tablet   Yes No   Patient not taking: Reported on 6/14/2022   levothyroxine (Synthroid) 137 mcg tablet   Yes No   Sig: Take by mouth   levothyroxine 25 mcg tablet   Yes No      Facility-Administered Medications: None       Past Medical History:   Diagnosis Date   • COVID-19    • Osteoporosis        Past Surgical History:   Procedure Laterality Date   • THYROID SURGERY         No family history on file  I have reviewed and agree with the history as documented  E-Cigarette/Vaping   • E-Cigarette Use Never User      E-Cigarette/Vaping Substances   • Nicotine No      Social History     Tobacco Use   • Smoking status: Former Smoker   • Smokeless tobacco: Never Used   Vaping Use   • Vaping Use: Never used   Substance Use Topics   • Alcohol use: Yes     Alcohol/week: 2 0 standard drinks     Types: 2 Glasses of wine per week     Comment: occ   • Drug use: Never       Review of Systems   Constitutional: Negative for chills and fever  HENT: Negative for ear pain and sore throat  Eyes: Negative for pain  Respiratory: Negative for cough and shortness of breath  Cardiovascular: Negative for chest pain and palpitations     Gastrointestinal: Negative for abdominal pain and vomiting  Genitourinary: Negative for dysuria and hematuria  Musculoskeletal: Positive for back pain  Negative for arthralgias and neck pain  Skin: Negative for color change and rash  Neurological: Negative for seizures, syncope and headaches  All other systems reviewed and are negative  Physical Exam  Physical Exam  Vitals and nursing note reviewed  Constitutional:       General: She is not in acute distress  Appearance: Normal appearance  She is not ill-appearing, toxic-appearing or diaphoretic  HENT:      Head: Normocephalic and atraumatic  Right Ear: Tympanic membrane, ear canal and external ear normal       Left Ear: Tympanic membrane, ear canal and external ear normal       Nose: Nose normal       Mouth/Throat:      Mouth: Mucous membranes are moist       Pharynx: Oropharynx is clear  Eyes:      Extraocular Movements: Extraocular movements intact  Conjunctiva/sclera: Conjunctivae normal       Pupils: Pupils are equal, round, and reactive to light  Cardiovascular:      Rate and Rhythm: Normal rate and regular rhythm  Pulses: Normal pulses  Heart sounds: Normal heart sounds  Pulmonary:      Effort: Pulmonary effort is normal       Breath sounds: Normal breath sounds  Abdominal:      General: Abdomen is flat  Bowel sounds are normal       Palpations: Abdomen is soft  Musculoskeletal:      Cervical back: Normal range of motion and neck supple  Comments: Tenderness to the sacrum  No ecchymosis or swelling  Skin:     General: Skin is warm and dry  Capillary Refill: Capillary refill takes less than 2 seconds  Neurological:      General: No focal deficit present  Mental Status: She is alert and oriented to person, place, and time  Mental status is at baseline  Cranial Nerves: No cranial nerve deficit  Sensory: No sensory deficit           Vital Signs  ED Triage Vitals   Temperature Pulse Respirations Blood Pressure SpO2 10/12/22 1110 10/12/22 1110 10/12/22 1110 10/12/22 1110 10/12/22 1110   (!) 97 1 °F (36 2 °C) 82 18 118/80 100 %      Temp Source Heart Rate Source Patient Position - Orthostatic VS BP Location FiO2 (%)   10/12/22 1110 10/12/22 1110 10/12/22 1110 10/12/22 1110 --   Tympanic Monitor Sitting Left arm       Pain Score       10/12/22 1109       5           Vitals:    10/12/22 1110   BP: 118/80   Pulse: 82   Patient Position - Orthostatic VS: Sitting         Visual Acuity      ED Medications  Medications   ibuprofen (MOTRIN) tablet 400 mg (400 mg Oral Given 10/12/22 1132)       Diagnostic Studies  Results Reviewed     None                 XR sacrum and coccyx    (Results Pending)              Procedures  Procedures         ED Course                               SBIRT 22yo+    Flowsheet Row Most Recent Value   SBIRT (25 yo +)    In order to provide better care to our patients, we are screening all of our patients for alcohol and drug use  Would it be okay to ask you these screening questions? No Filed at: 10/12/2022 1117                    MDM  Number of Diagnoses or Management Options  Contusion of sacrum, initial encounter: new and requires workup  Diagnosis management comments: 21-year-old white female with mechanical fall yesterday  Landed in sitting position and complains of sacral pain  No acute fracture seen on x-ray  Advised on a pillow for comfort when sitting  Advised Tylenol or Motrin (with food) as needed for pain  Follow-up with primary care provider advised for any persistent concerns    Return precautions given       Amount and/or Complexity of Data Reviewed  Tests in the radiology section of CPT®: reviewed  Decide to obtain previous medical records or to obtain history from someone other than the patient: yes  Review and summarize past medical records: yes  Independent visualization of images, tracings, or specimens: yes    Risk of Complications, Morbidity, and/or Mortality  Presenting problems: low  Diagnostic procedures: low  Management options: low    Patient Progress  Patient progress: stable      Disposition  Final diagnoses:   Contusion of sacrum, initial encounter     Time reflects when diagnosis was documented in both MDM as applicable and the Disposition within this note     Time User Action Codes Description Comment    10/12/2022 12:40 PM Panda, [facility] [S30  0XXA] Contusion of sacrum, initial encounter       ED Disposition     ED Disposition   Discharge    Condition   Stable    Date/Time   Wed Oct 12, 2022 12:40 PM    Comment   Christina Lester discharge to home/self care  Follow-up Information     Follow up With Specialties Details Why [address] [place] [address], [address]   [address]  [address] [facility]  [phone]            Discharge Medication List as of 10/12/2022 12:40 PM      CONTINUE these medications which have NOT CHANGED    Details   [place] Thyroid 90 MG tablet Starting Mon 2/8/2021, Historical Med      !! levothyroxine (Synthroid) 137 mcg tablet Take by mouth, Historical Med      !! levothyroxine 25 mcg tablet Historical Med       !! - Potential duplicate medications found  Please discuss with provider  No discharge procedures on file      PDMP Review     None          ED Provider  Electronically Signed by           Shabbir Hinojosa PA-C  10/12/22 7506

## 2022-10-12 NOTE — DISCHARGE INSTRUCTIONS
May take Tylenol or Motrin (with food) as needed for pain  Over-the-counter donut cushion advised for comfort with sitting    Follow-up with your primary care provider for any persistent concerns      Return to the ED for increased pain, worsening symptoms

## 2022-10-14 ENCOUNTER — OFFICE VISIT (OUTPATIENT)
Dept: INTERNAL MEDICINE CLINIC | Facility: CLINIC | Age: 62
End: 2022-10-14
Payer: COMMERCIAL

## 2022-10-14 VITALS
DIASTOLIC BLOOD PRESSURE: 74 MMHG | OXYGEN SATURATION: 98 % | HEART RATE: 71 BPM | SYSTOLIC BLOOD PRESSURE: 120 MMHG | BODY MASS INDEX: 25.27 KG/M2 | HEIGHT: 64 IN | WEIGHT: 148 LBS

## 2022-10-14 DIAGNOSIS — E03.9 ACQUIRED HYPOTHYROIDISM: Primary | ICD-10-CM

## 2022-10-14 DIAGNOSIS — E04.1 THYROID NODULE: ICD-10-CM

## 2022-10-14 DIAGNOSIS — R91.1 LUNG NODULE: ICD-10-CM

## 2022-10-14 DIAGNOSIS — Z13.220 SCREENING FOR HYPERCHOLESTEROLEMIA: ICD-10-CM

## 2022-10-14 DIAGNOSIS — M81.0 AGE-RELATED OSTEOPOROSIS WITHOUT CURRENT PATHOLOGICAL FRACTURE: ICD-10-CM

## 2022-10-14 DIAGNOSIS — R00.2 PALPITATION: ICD-10-CM

## 2022-10-14 PROCEDURE — 99205 OFFICE O/P NEW HI 60 MIN: CPT | Performed by: INTERNAL MEDICINE

## 2022-10-14 NOTE — PROGRESS NOTES
Name: Christina Lester      : 1960      MRN: [id number]  Encounter Provider: Roseline Keith MD  Encounter Date: 10/14/2022   Encounter department: [address]     1  Acquired hypothyroidism  Assessment & Plan:  8-10 years colon  Initially was on [place] Thyroid subsequently is switch over recently to levothyroxine total of 37 5 mcg  Please refer to the 1st note for the detailed history    2022:  Free T4 0 79, TSH 12 5, PTH 35, magnesium 2 2, phosphorus 3 8, free T3 3 5, free T4 1 28,    Follow-up free T4, TSH and T3 be done next week per endocrinologist and she has appointment with them in 2 weeks  Patient does not have any clinical symptoms of hypothyroidism or hyper thyroxine Ruthy this time    Orders:  -     Comprehensive metabolic panel; Future; Expected date: 10/28/2022  -     Lipid panel; Future; Expected date: 10/28/2022    2  Palpitation  Assessment & Plan:  Summary around 2022 patient started with palpitations subsequently seen by cardiologist underwent calcium score which was 0 incidental finding of lung nodule possibly on chest x-ray during workup at that point  Also had possibly echocardiogram   No stress test or 48 hour heart monitor done  It was felt to be secondary to thyroid medications and patient is symptom-free since the thyroid medicine side    Patient remains symptom-free at this time  Orders:  -     Comprehensive metabolic panel; Future; Expected date: 10/28/2022  -     Lipid panel; Future; Expected date: 10/28/2022    3  Age-related osteoporosis without current pathological fracture  Assessment & Plan:  Osteoporosis:  Patient had a DEXA scan done on 2022:  Lumbar spine T-score-3 3:, left femoral neck T-score-1 4, left total hip T-score -0 7; this is the 1st DEXA scan  We talked about the staging, treatment options,    We talked about Fosamax or similar medications, versus Prolia versus Reclast  Patient's mother had a problem with the Reclast the and probable memory issues  Be going to avoid that  Also patient is concern about of Fosamax or similar medications the side effect we talked about that of our plan is at this time to recheck DEXA scan back in 2 years  We will continue nonpharmacological approach including calcium with vitamin-D for right now, exercise, see does not smoke and does not drink alcohol anyway as well as does not use excessive caffeine  4  Lung nodule  Assessment & Plan:    Lung nodule: Incidental finding during workup for the palpitations possibly on the chest x-ray sometime in March 2022 03/31/2022:  CT scan of the chest:  6 mm nodule at the base of the left lower lobe at costophrenic angle  Peripheral interstitial opacities in the bilateral lower lung zone suggestive of mild fibrotic changes  Other than that CT scan of the chest was unremarkable  10/07/2022:  Surveillance follow-up CT scan of the chest:  1 cm nodule in the left lobe of the thyroid gland incidental finding, unchanged 6 mm subpleural left lower lobe nodule, degenerative changes of the spine, hepatics cyst-too small to characterize  Remote history of smoking quit 30 years ago, patient started smoking at age 14 and quit at age 29  Patient smoke a pack a day for altogether 14 years that is 7 pack year     Patient started following up with the pulmonologist   See would like me to follow through on this matter  The patient is clinically is symptom-free from the pulmonary standpoint no cough chest congestion shortness of breath hemoptysis disease or weight loss no exposure history of TB    History of COVID-19 September 23, 2022    Seven pack year history of smoking quit 30 years ago    Orders:  -     CT chest wo contrast; Future; Expected date: 04/14/2023    5  Thyroid nodule  Assessment & Plan:  Thyroid nodule:   Incidental finding of thyroid nodule during CT scan of the chest as a part of the surveillance 6 mm subpleural left lower lobe nodule done on 10/07/2022:  10/07/2022:  CT scan of the chest revealed likely 1 cm nodule within the left lobe of the thyroid gland  Thyroid nodule without suspicious feature 1-1 4 cm in patient's of 35 year or greater  According to them no further workup is recommended  Patient is being scheduled for thyroid ultrasound by by endocrinologist   Patient will call and set up her own appointment      Patient would like to get the thyroid ultrasound here at local hospital I will give her a new order  No compressive symptoms no symptoms of hypo or hyperthyroidism sees a known case of hypothyroid    Orders:  -     US thyroid; Future; Expected date: 10/28/2022  -     Comprehensive metabolic panel; Future; Expected date: 10/28/2022  -     Lipid panel; Future; Expected date: 10/28/2022    6  Screening for hypercholesterolemia  -     Comprehensive metabolic panel; Future; Expected date: 10/28/2022  -     Lipid panel; Future; Expected date: 10/28/2022           Subjective     Pt's firs visit:    Recent ER visit:  Date of visit 10/12/2022  ER notes were reviewed  Patient apparently was playing with the dog see fell backwards  See had pain in the tailbone  She does have a previous history of osteoporosis diagnosed at age 63  Patient presented with the low back pain  ER physician's finding noted  Patient was tender in the sacrum  Chronic disease:  Hypothyroidism: Pt was dx 8-10 years, incidental finding at age around 50 when workup was being done as a part of menopause, a patient's family doctor was managing however recently patient switch over to the endocrinologist   Patient was told at 1 time that she was overmedicated she was taking 80 thyroid Armor + and thyroid 25 mcg,   Patient is here decided to see endocrinologist   Current dose of Levoxyl is 37 5  She is no longer on armor thyroid    The endocrinologist wanted her to take 50 mcg patient opted to take somewhat lower does to be on safe side and currently taking 1 of 25 and another half of 25 equal to 37 5  Of patient is going for follow-up TSH free T4 and T3 in next 1-2 weeks and says a see has a subsequent follow-up appointment with endocrinologist in 2 weeks  No clinical symptoms of hyperthyroidism or hypothyroidism at this time  History of palpitation:  Patient was seen by cardiologist subsequently underwent calcium score CT angio     CT scan score was done on 03/31/2022 which revealed total calcium score 0  Patient was told to have a tiny lung nodule on this study though I do not see on the formal report  In any event it was followed upon  Patient does not have any palpitations anymore  It was admitted to the thyroid, patient's may have echocardiogram done stress test was not done  no medications required and was attributed to the thyroid      Lung nodule: Incidental finding during workup for the palpitations possibly on the chest x-ray sometime in March 2022 03/31/2022:  CT scan of the chest:  6 mm nodule at the base of the left lower lobe at costophrenic angle  Peripheral interstitial opacities in the bilateral lower lung zone suggestive of mild fibrotic changes  Other than that CT scan of the chest was unremarkable  10/07/2022:  Surveillance follow-up CT scan of the chest:  1 cm nodule in the left lobe of the thyroid gland incidental finding, unchanged 6 mm subpleural left lower lobe nodule, degenerative changes of the spine, hepatics cyst-too small to characterize  Remote history of smoking quit 30 years ago, patient started smoking at age 14 and quit at age 29  Patient smoke a pack a day for altogether 14 years that is 7 pack year       Thyroid nodule:   Incidental finding of thyroid nodule during CT scan of the chest as a part of the surveillance 6 mm subpleural left lower lobe nodule done on 10/07/2022:  10/07/2022:  CT scan of the chest revealed likely 1 cm nodule within the left lobe of the thyroid gland   Thyroid nodule without suspicious feature 1-1 4 cm in patient's of 35 year or greater  According to them no further workup is recommended  Patient is being scheduled for thyroid ultrasound by by endocrinologist   Patient will call and set up her own appointment      Osteoporosis:  Patient had a DEXA scan done on 03/31/2022:  Lumbar spine T-score-3 3:, left femoral neck T-score-1 4, left total hip T-score -0 7; this is the 1st DEXA scan  We talked about the staging, treatment options,  We talked about Fosamax or similar medications, versus Prolia versus Reclast   Patient's mother had a problem with the Reclast the and probable memory issues  Be going to avoid that  Also patient is concern about of Fosamax or similar medications the side effect we talked about that of our plan is at this time to recheck DEXA scan back in 2 years  We will continue nonpharmacological approach including calcium with vitamin-D for right now, exercise, see does not smoke and does not drink alcohol anyway as well as does not use excessive caffeine  Left:  04/27/2022:  Free T4 0 79, TSH 12 5, PTH 35, magnesium 2 2, phosphorus 3 8, free T3 3 5, free T4 1 28, vitamin-D 25 hydroxy 56, CBC normal, CMP normal, blood sugar 100, celiac panel negative,      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  No previous hospitalization  Patient used to ski before in the past   Patient does started smoking at age 14 quit at age 29 half half pack a day total 7 pack year  One brother ,  Divorsed, no children    Hobbies:  Goes to gym, kayaking, has a dog        Patient is up-to-date with mammogram and sees gynecologist on regular basis    Patient is up-to-date with colonoscopy approximately 2 years ago was told to have a normal colonoscopy and follow-up colonoscopy in 10 years is recommended    Review of Systems   Constitutional: Negative for chills, fatigue, fever and unexpected weight change  HENT: Negative for ear pain, postnasal drip, sinus pain and sore throat  Eyes: Negative for pain and redness  Respiratory: Negative for cough and shortness of breath  Cardiovascular: Negative for chest pain, palpitations and leg swelling  Gastrointestinal: Negative for abdominal pain, diarrhea and nausea  Endocrine: Negative for cold intolerance, polydipsia and polyuria  Genitourinary: Negative for dysuria, frequency and urgency  Musculoskeletal: Positive for back pain (Related to recent fall)  Negative for arthralgias, gait problem and myalgias  Skin: Negative for rash  Allergic/Immunologic: Negative  Neurological: Negative for dizziness and headaches  Hematological: Negative for adenopathy  Psychiatric/Behavioral: Negative for behavioral problems  The patient is not nervous/anxious  Past Medical History:   Diagnosis Date   • Allergic    • Arthritis    • COVID-19    • Hypothyroidism 2021   • Osteoporosis      Past Surgical History:   Procedure Laterality Date   • CYST REMOVAL      From foot   • TONSILLECTOMY       History reviewed  No pertinent family history  Social History     Socioeconomic History   • Marital status: Single     Spouse name: None   • Number of children: None   • Years of education: None   • Highest education level: None   Occupational History   • None   Tobacco Use   • Smoking status: Former Smoker   • Smokeless tobacco: Never Used   Vaping Use   • Vaping Use: Never used   Substance and Sexual Activity   • Alcohol use:  Yes     Alcohol/week: 2 0 standard drinks     Types: 2 Glasses of wine per week     Comment: occ   • Drug use: Never   • Sexual activity: None   Other Topics Concern   • None   Social History Narrative   • None     Social Determinants of Health     Financial Resource Strain: Not on file   Food Insecurity: Not on file   Transportation Needs: Not on file   Physical Activity: Not on file   Stress: Not on file   Social Connections: Not on file   Intimate Partner Violence: Not on file   Housing Stability: Not on file     Current Outpatient Medications on File Prior to Visit   Medication Sig   • levothyroxine 25 mcg tablet One and a half tablets daily (37 5mg total) daily   • [DISCONTINUED] [place] Thyroid 90 MG tablet  (Patient not taking: Reported on 6/14/2022)   • [DISCONTINUED] levothyroxine (Synthroid) 137 mcg tablet Take by mouth     Allergies   Allergen Reactions   • Codeine Other (See Comments)     hypersensitivity     Immunization History   Administered Date(s) Administered   • COVID-19 MODERNA VACC 0 25 ML IM BOOSTER 12/07/2021   • COVID-19 MODERNA VACC 0 5 ML IM 04/07/2021, 05/05/2021       Objective     /74   Pulse 71   Ht 5' 4" (1 626 m)   Wt 67 1 kg (148 lb)   SpO2 98%   BMI 25 40 kg/m²     Physical Exam  Constitutional:       General: She is not in acute distress  Appearance: She is well-developed  She is not ill-appearing, toxic-appearing or diaphoretic  HENT:      Head: Normocephalic and atraumatic  Right Ear: External ear normal       Left Ear: External ear normal       Nose: No congestion or rhinorrhea  Right Turbinates: Pale  Left Turbinates: Pale  Mouth/Throat:      Mouth: No oral lesions  Dentition: No gum lesions  Palate: No mass  Pharynx: No posterior oropharyngeal erythema  Eyes:      General: Lids are normal  Lids are everted, no foreign bodies appreciated  Gaze aligned appropriately  Right eye: No discharge  Left eye: No discharge  Conjunctiva/sclera: Conjunctivae normal    Neck:      Thyroid: No thyroid mass, thyromegaly or thyroid tenderness  Vascular: No carotid bruit or JVD  Trachea: Trachea normal       Comments: I did not feel any thyroid nodule on the left side and thyroid is nontender  Cardiovascular:      Rate and Rhythm: Regular rhythm  Heart sounds: Normal heart sounds  Pulmonary:      Effort: No respiratory distress  Breath sounds: Normal breath sounds  No wheezing or rales  Abdominal:      General: Bowel sounds are normal  There is no distension  Palpations: There is no mass  Tenderness: There is no rebound  Musculoskeletal:      Lumbar back: Normal  No spasms or tenderness  No scoliosis  Right lower leg: No edema  Left lower leg: No edema  Comments: Examination of the sacral area deferred   Lymphadenopathy:      Cervical: No cervical adenopathy  Skin:     General: Skin is warm  Coloration: Skin is not jaundiced or pale  Findings: No rash  Neurological:      Mental Status: She is alert        Coordination: Coordination normal    Psychiatric:         Behavior: Behavior normal          Judgment: Judgment normal        Solomon Helm MD

## 2022-10-14 NOTE — ASSESSMENT & PLAN NOTE
Osteoporosis:  Patient had a DEXA scan done on 03/31/2022:  Lumbar spine T-score-3 3:, left femoral neck T-score-1 4, left total hip T-score -0 7; this is the 1st DEXA scan  We talked about the staging, treatment options,  We talked about Fosamax or similar medications, versus Prolia versus Reclast   Patient's mother had a problem with the Reclast the and probable memory issues  Be going to avoid that  Also patient is concern about of Fosamax or similar medications the side effect we talked about that of our plan is at this time to recheck DEXA scan back in 2 years  We will continue nonpharmacological approach including calcium with vitamin-D for right now, exercise, see does not smoke and does not drink alcohol anyway as well as does not use excessive caffeine

## 2022-10-14 NOTE — ASSESSMENT & PLAN NOTE
Lung nodule: Incidental finding during workup for the palpitations possibly on the chest x-ray sometime in March 2022 03/31/2022:  CT scan of the chest:  6 mm nodule at the base of the left lower lobe at costophrenic angle  Peripheral interstitial opacities in the bilateral lower lung zone suggestive of mild fibrotic changes  Other than that CT scan of the chest was unremarkable  10/07/2022:  Surveillance follow-up CT scan of the chest:  1 cm nodule in the left lobe of the thyroid gland incidental finding, unchanged 6 mm subpleural left lower lobe nodule, degenerative changes of the spine, hepatics cyst-too small to characterize  Remote history of smoking quit 30 years ago, patient started smoking at age 14 and quit at age 29  Patient smoke a pack a day for altogether 14 years that is 7 pack year     Patient started following up with the pulmonologist   See would like me to follow through on this matter    The patient is clinically is symptom-free from the pulmonary standpoint no cough chest congestion shortness of breath hemoptysis disease or weight loss no exposure history of TB    History of COVID-19 September 23, 2022    Seven pack year history of smoking quit 30 years ago

## 2022-10-14 NOTE — ASSESSMENT & PLAN NOTE
Summary around March 2022 patient started with palpitations subsequently seen by cardiologist underwent calcium score which was 0 incidental finding of lung nodule possibly on chest x-ray during workup at that point  Also had possibly echocardiogram   No stress test or 48 hour heart monitor done  It was felt to be secondary to thyroid medications and patient is symptom-free since the thyroid medicine side    Patient remains symptom-free at this time

## 2022-10-14 NOTE — PATIENT INSTRUCTIONS
Follow with Consultants as per their and our suggestion    Follow up in one month or as needed earlier    Follow all instructions as advised and discussed  Take your medications as prescribed  Call the office immediately if you experience any side effects  Ask questions if you do not understand  Keep your scheduled appointment as advised or come sooner if necessary or in doubt  Best time to call for non-urgent matter or questions on weekdays is between 9am and 12 noon  See physician for any new symptoms or worsening of current symptoms  Urgent or emergent situations call 911 and report to nearest emergency room  I spent  60 minutes taking care of this patient including clinical care, conseling, collaboration, chart, lab and consultaion review as appropriate    Patient is to get labs 1 week(s) prior to next visit if advised     Go for the CT scan of the chest in 6 months  I am giving you order if you need any assistance scheduling please let me know  Proceed with ultrasound of the thyroid how home  If you need any assistance in scheduling please let us know  Go for the blood test including CBC and lipid profile along with your thyroid blood test ordered by your endocrinologist     Bon Jackman home you may take Tylenol for the pain in the sacral area  It will take probably 2-6 weeks for completely to go away    If it gets worse let me know

## 2022-10-14 NOTE — ASSESSMENT & PLAN NOTE
Thyroid nodule: Incidental finding of thyroid nodule during CT scan of the chest as a part of the surveillance 6 mm subpleural left lower lobe nodule done on 10/07/2022:  10/07/2022:  CT scan of the chest revealed likely 1 cm nodule within the left lobe of the thyroid gland  Thyroid nodule without suspicious feature 1-1 4 cm in patient's of 35 year or greater  According to them no further workup is recommended  Patient is being scheduled for thyroid ultrasound by by endocrinologist   Patient will call and set up her own appointment      Patient would like to get the thyroid ultrasound here at local hospital I will give her a new order      No compressive symptoms no symptoms of hypo or hyperthyroidism sees a known case of hypothyroid

## 2022-10-14 NOTE — ASSESSMENT & PLAN NOTE
8-10 years colon  Initially was on Birney Thyroid subsequently is switch over recently to levothyroxine total of 37 5 mcg  Please refer to the 1st note for the detailed history    04/27/2022:  Free T4 0 79, TSH 12 5, PTH 35, magnesium 2 2, phosphorus 3 8, free T3 3 5, free T4 1 28,    Follow-up free T4, TSH and T3 be done next week per endocrinologist and she has appointment with them in 2 weeks      Patient does not have any clinical symptoms of hypothyroidism or hyper thyroxine Ruthy this time

## 2022-10-17 ENCOUNTER — TELEPHONE (OUTPATIENT)
Dept: ADMINISTRATIVE | Facility: OTHER | Age: 62
End: 2022-10-17

## 2022-10-17 NOTE — LETTER
Procedure Request Form: Colonoscopy      Date Requested: 10/17/22  Patient: Tonye Pickering  Patient : 1960   Referring Provider: Ovalles Timmons, MD        Date of Procedure ______________________________       The above patient has informed us that they have completed their   most recent Colonoscopy at your facility  Please complete   this form and attach all corresponding procedure reports/results  Comments __________________________________________________________  ____________________________________________________________________  ____________________________________________________________________  ____________________________________________________________________    Facility Completing Procedure _________________________________________    Form Completed By (print name) _______________________________________      Signature __________________________________________________________      These reports are needed for  compliance  Please fax this completed form and a copy of the procedure report to our office located at [place] [address] as soon as possible to [phone] velia Gross: Phone [phone]    We thank you for your assistance in treating our mutual patient

## 2022-10-17 NOTE — LETTER
Procedure Request Form: Mammogram      Date Requested: 10/17/22  Patient: Duayne Army  Patient : 1960   Referring Provider: Roxane Lefty, MD        Date of Procedure ______________________________       The above patient has informed us that they have completed their   most recent Mammogram at your facility  Please complete   this form and attach all corresponding procedure reports/results  Comments __________________________________________________________  ____________________________________________________________________  ____________________________________________________________________  ____________________________________________________________________    Facility Completing Procedure _________________________________________    Form Completed By (print name) _______________________________________      Signature __________________________________________________________      These reports are needed for  compliance  Please fax this completed form and a copy of the procedure report to our office located at [place] [address] as soon as possible to [phone] velia Etienne Dealili: Phone [phone]    We thank you for your assistance in treating our mutual patient

## 2022-10-17 NOTE — TELEPHONE ENCOUNTER
----- Message from Chrissy Hammond sent at 10/14/2022 12:27 PM EDT -----  [address]  10/14/22 12:27 PM    Sherie, our patient Vania Roberson has had CRC: Colonoscopy completed/performed  Please assist in updating the patient chart by making an External outreach to DR Yehuda Gerber facility located in [place]  The date of service is with in 5 years      Thank you,  Laney Burleson  [facility]

## 2022-10-17 NOTE — TELEPHONE ENCOUNTER
Upon review of the In Basket request and the patient's chart, initial outreach has been made via fax, please see Contacts section for details   mammo and colon request    Thank you  Bren Juarez

## 2022-10-17 NOTE — TELEPHONE ENCOUNTER
----- Message from Miko Moody MD sent at 10/14/2022  1:22 PM EDT -----  Regarding: Colonoscopy  10/14/22 1:22 PM    Hello, our patient Agnieszka Wright has had CRC: Colonoscopy completed/performed  Please assist in updating the patient chart by making an External outreach to Dr Aditi York in [facility] has a gastroenterologist affiliated with [facility]  facility located in office is located in [facility]     Test was done at [facility] in [address] [place] [address]    The date of service is 5 years ago        Thank you,  Marielena Givens  [facility]

## 2022-10-17 NOTE — TELEPHONE ENCOUNTER
Upon review of the In Basket request we were able to locate, review, and update the patient chart as requested for CRC: Colonoscopy  Any additional questions or concerns should be emailed to the Practice Liaisons via the appropriate education email address, please do not reply via In Basket      Thank you  Caroline Patterson

## 2022-10-17 NOTE — TELEPHONE ENCOUNTER
----- Message from Ryan Ashley sent at 10/14/2022 12:24 PM EDT -----  Regarding: Mammo  10/14/22 12:25 PM    Sherie, our patient Obdulia Merza has had Mammogram completed/performed  Please assist in updating the patient chart by making an External outreach to [facility], [facility] facility located in [place],   The date of service is with in 1 year   Phone [phone]    Thank you,  Ryan Ashley  [facility] INTER[facility]

## 2022-10-17 NOTE — TELEPHONE ENCOUNTER
----- Message from Sarah Guerrero MD sent at 10/14/2022  1:26 PM EDT -----  Regarding: mammogram  10/14/22 1:26 PM    Hello, our patient Laverne Dupont has had Mammogram completed/performed  Please assist in updating the patient chart by making an External outreach to remains imaging on [address] [facility] facility located in [address] [place] [address] imaging center  The date of service is done within last 12 months      Thank you,  Mick Givens  [facility]

## 2022-10-19 NOTE — TELEPHONE ENCOUNTER
Upon review of the In Basket request we were able to locate, review, and update the patient chart as requested for Mammogram     Any additional questions or concerns should be emailed to the Practice Liaisons via the appropriate education email address, please do not reply via In Basket      Thank you  Judith Shone

## 2022-10-28 LAB
ALBUMIN SERPL-MCNC: 4.7 G/DL (ref 3.8–4.9)
ALBUMIN/GLOB SERPL: 2.6 {RATIO} (ref 1.2–2.2)
ALP SERPL-CCNC: 89 IU/L (ref 44–121)
ALT SERPL-CCNC: 11 IU/L (ref 0–32)
AST SERPL-CCNC: 14 IU/L (ref 0–40)
BILIRUB SERPL-MCNC: 0.4 MG/DL (ref 0–1.2)
BUN SERPL-MCNC: 10 MG/DL (ref 8–27)
BUN/CREAT SERPL: 14 (ref 12–28)
CALCIUM SERPL-MCNC: 9.2 MG/DL (ref 8.7–10.3)
CHLORIDE SERPL-SCNC: 101 MMOL/L (ref 96–106)
CHOLEST SERPL-MCNC: 225 MG/DL (ref 100–199)
CO2 SERPL-SCNC: 25 MMOL/L (ref 20–29)
CREAT SERPL-MCNC: 0.73 MG/DL (ref 0.57–1)
EGFR: 94 ML/MIN/1.73
GLOBULIN SER-MCNC: 1.8 G/DL (ref 1.5–4.5)
GLUCOSE SERPL-MCNC: 102 MG/DL (ref 70–99)
HDLC SERPL-MCNC: 98 MG/DL
LDLC SERPL CALC-MCNC: 115 MG/DL (ref 0–99)
POTASSIUM SERPL-SCNC: 4.3 MMOL/L (ref 3.5–5.2)
PROT SERPL-MCNC: 6.5 G/DL (ref 6–8.5)
SL AMB VLDL CHOLESTEROL CALC: 12 MG/DL (ref 5–40)
SODIUM SERPL-SCNC: 140 MMOL/L (ref 134–144)
TRIGL SERPL-MCNC: 68 MG/DL (ref 0–149)

## 2022-10-29 ENCOUNTER — HOSPITAL ENCOUNTER (OUTPATIENT)
Dept: RADIOLOGY | Facility: HOSPITAL | Age: 62
Discharge: HOME | End: 2022-10-29

## 2022-10-29 DIAGNOSIS — E04.1 THYROID NODULE: ICD-10-CM

## 2022-10-31 ENCOUNTER — OFFICE VISIT (OUTPATIENT)
Dept: PODIATRY | Facility: CLINIC | Age: 62
End: 2022-10-31

## 2022-10-31 VITALS
SYSTOLIC BLOOD PRESSURE: 120 MMHG | WEIGHT: 148 LBS | RESPIRATION RATE: 17 BRPM | DIASTOLIC BLOOD PRESSURE: 74 MMHG | BODY MASS INDEX: 25.27 KG/M2 | HEIGHT: 64 IN

## 2022-10-31 DIAGNOSIS — M21.961 ACQUIRED DEFORMITY OF RIGHT FOOT: ICD-10-CM

## 2022-10-31 DIAGNOSIS — M67.40 MUCOID CYST OF JOINT: Primary | ICD-10-CM

## 2022-10-31 NOTE — PROGRESS NOTES
Assessment/Plan:   pain upon ambulation   Mucoid cyst 4th right and left toe    mycosis of nail  Acquired deformity of foot       Plan   Foot exam performed   Patient educated on condition   Right 4th toe lesion debrided   Phenol applied   Patient may need surgical excision of lesions            Diagnoses and all orders for this visit:     Mucoid cyst of joint , 4th right toe     Onychomycosis      acquired deformity of foot bilateral                Subjective:   Patient presents for evaluation   She is doing better  Chelle Johnson is taking Lamisil without problem   She has a small cyst developing on her 4th right toe      No Known Allergies        Current Outpatient Medications:   •  [place] Thyroid 90 MG tablet, , Disp: , Rfl:   •  levothyroxine (Synthroid) 137 mcg tablet, Take by mouth, Disp: , Rfl:   •  terbinafine (LamISIL) 250 mg tablet, Take 1 tablet (250 mg total) by mouth daily, Disp: 30 tablet, Rfl: 0     There is no problem list on file for this patient             Patient ID: Florina Storm is a 62 y o  female      HPI     The following portions of the patient's history were reviewed and updated as appropriate:      family history is not on file        has no history on file for tobacco, alcohol, and drug         Objective:  Patient's shoes removed  By patient         Foot ExamPhysical Exam          General  General Appearance: appears stated age and healthy   Orientation: alert and oriented to person, place, and time   Affect: appropriate   Gait: unimpaired         Right Foot/Ankle      Inspection and Palpation  Swelling: dorsum   Arch: pes planus  Hallux valgus: yes     Neurovascular  Dorsalis pedis: 3+  Posterior tibial: 3+        Left Foot/Ankle       Inspection and Palpation  Tenderness: bony tenderness   Swelling: dorsum   Arch: pes planus  Hallux valgus: yes  Skin Exam: skin changes;      Neurovascular  Dorsalis pedis: 3+  Posterior tibial: 3+           Physical Exam  Vitals signs and nursing note reviewed  Constitutional:       Appearance: Normal appearance  Cardiovascular:      Rate and Rhythm: Normal rate and regular rhythm       Pulses:           Dorsalis pedis pulses are 3+ on the right side and 3+ on the left side         Posterior tibial pulses are 3+ on the right side and 3+ on the left side  Musculoskeletal:      Right foot: Bunion present       Left foot: Bony tenderness and bunion present  Skin:     Comments:      DIPJ 4th right toe demonstrates small mucoid cyst   Incision and drainage done   Gel noted   No evidence of infection        Neurological:      Mental Status: She is alert  Psychiatric:         Mood and Affect: Mood normal          BehaviorBufransisca Romo         Thought Content:  Thought content normal          Judgment: Judgment normal

## 2022-11-02 NOTE — RESULT ENCOUNTER NOTE
Reviewed    D/w pt last pm, recommend to follow with Endo, get f/u US in 6 months    Will talk at next meeting end of the month

## 2022-11-15 DIAGNOSIS — E03.9 ACQUIRED HYPOTHYROIDISM: Primary | ICD-10-CM

## 2022-11-23 ENCOUNTER — OFFICE VISIT (OUTPATIENT)
Dept: INTERNAL MEDICINE CLINIC | Facility: CLINIC | Age: 62
End: 2022-11-23

## 2022-11-23 VITALS
OXYGEN SATURATION: 98 % | HEART RATE: 74 BPM | DIASTOLIC BLOOD PRESSURE: 76 MMHG | HEIGHT: 64 IN | BODY MASS INDEX: 24.75 KG/M2 | SYSTOLIC BLOOD PRESSURE: 122 MMHG | WEIGHT: 145 LBS

## 2022-11-23 DIAGNOSIS — E04.1 THYROID NODULE: ICD-10-CM

## 2022-11-23 DIAGNOSIS — E03.9 ACQUIRED HYPOTHYROIDISM: Primary | ICD-10-CM

## 2022-11-23 DIAGNOSIS — R91.1 LUNG NODULE: ICD-10-CM

## 2022-11-23 PROBLEM — R00.2 PALPITATION: Status: RESOLVED | Noted: 2022-10-14 | Resolved: 2022-11-23

## 2022-11-23 RX ORDER — LEVOTHYROXINE SODIUM 0.07 MG/1
TABLET ORAL
COMMUNITY
Start: 2022-11-15 | End: 2022-11-23 | Stop reason: SDUPTHER

## 2022-11-23 RX ORDER — LEVOTHYROXINE SODIUM 0.07 MG/1
75 TABLET ORAL
Qty: 90 TABLET | Refills: 1 | Status: SHIPPED | OUTPATIENT
Start: 2022-11-23

## 2022-11-23 NOTE — PROGRESS NOTES
Name: Hortensia Alexis      : 1960      MRN: [id number]  Encounter Provider: Osei Dang MD  Encounter Date: 2022   [facility] [address] [facility]     1  Acquired hypothyroidism  Assessment & Plan:  2022:  TSH 12 5,:  Most recent TSH 5 range  Previous dose of levothyroxine 37 mcg  Recommended dose by endocrinologist was in the range of 100-112  Previous history of sensitivity to the levothyroxine  Paragraphs interim does a acceptable will start with 75 mcg which is already done couple of days ago  Of a repeat TSH to be monitored in 6 weeks home close surveillance both clinically as well as by the lab  I will see her back in 2 months  Also meanwhile referring to endocrinologist for their input  Orders:  -     levothyroxine 75 mcg tablet; Take 1 tablet (75 mcg total) by mouth daily in the early morning  -     Ambulatory referral to Endocrinology; Future  -     US thyroid; Future; Expected date: 2023    2  Thyroid nodule  Assessment & Plan:      Orders:  -     Ambulatory referral to Endocrinology; Future  -     US thyroid; Future; Expected date: 2023    3  Lung nodule  Assessment & Plan:  Follow-up CT of the chest is already ordered  In 6 months for surveillance      Lasting about constipation recommend high-fiber diet increase fluid if constipation persist patient let me know hopefully with the adjustment in the dose of the thyroid she will even get better  Patient is up-to-date with colonoscopy  Subjective     This is 2nd visit  Patient is here for follow-up  Hypothyroidism:  Previous TSH was 12 5  Most recent TSH was 5 17 , most current dose was 37 5 mcg  Endocrinologist wanted her to increase between either 112 or 100 mcg  Patient had remote history of sensitivity  We opted to settle for 75 mcg which she started  Part of the reason is also she was having some constipation    Of we will do follow-up TSH back in 6 weeks  Home patient will monitor for any hypo or hyperthyroid symptoms  No further palpitations  Lung nodule: Incidental finding during workup for the palpitations possibly on the chest x-ray sometime in March 2022 03/31/2022:  CT scan of the chest:  6 mm nodule at the base of the left lower lobe at costophrenic angle  Peripheral interstitial opacities in the bilateral lower lung zone suggestive of mild fibrotic changes  Other than that CT scan of the chest was unremarkable  10/07/2022:  Surveillance follow-up CT scan of the chest:  1 cm nodule in the left lobe of the thyroid gland incidental finding, unchanged 6 mm subpleural left lower lobe nodule, degenerative changes of the spine, hepatics cyst-too small to characterize  Remote history of smoking quit 30 years ago, patient started smoking at age 14 and quit at age 29  Patient smoke a pack a day for altogether 14 years that is 7 pack year     Follow-up CT scan is already set up for the lung nodule in 6 months  No visits with results within 2 Week(s) from this visit    Latest known visit with results is:  Orders Only on 10/28/2022  Glucose, Random           Value: 102(mg/dL) (H)     Dt: 10/28/2022  BUN                       Value: 10(mg/dL)          Dt: 10/28/2022  Creatinine                Value: 0 73(mg/dL)        Dt: 10/28/2022  eGFR                      Value: 94(mL/min/1 73)    Dt: 10/28/2022  SL AMB BUN/CREATININE RA* Value: 14                 Dt: 10/28/2022  Sodium                    Value: 140(mmol/L)        Dt: 10/28/2022  Potassium                 Value: 4 3(mmol/L)        Dt: 10/28/2022  Chloride                  Value: 101(mmol/L)        Dt: 10/28/2022  CO2                       Value: 25(mmol/L)         Dt: 10/28/2022  CALCIUM                   Value: 9 2(mg/dL)         Dt: 10/28/2022  Protein, Total            Value: 6 5(g/dL)          Dt: 10/28/2022  Albumin                   Value: 4 7(g/dL) Dt: 10/28/2022  Globulin, Total           Value: 1 8(g/dL)          Dt: 10/28/2022  Albumin/Globulin Ratio    Value: 2 6 (H)            Dt: 10/28/2022  TOTAL BILIRUBIN           Value: 0 4(mg/dL)         Dt: 10/28/2022  Alk Phos Isoenzymes       Value: 89(IU/L)           Dt: 10/28/2022  AST                       Value: 14(IU/L)           Dt: 10/28/2022  ALT                       Value: 11(IU/L)           Dt: 10/28/2022  Cholesterol, Total        Value: 225(mg/dL) (H)     Dt: 10/28/2022  Triglycerides             Value: 68(mg/dL)          Dt: 10/28/2022  HDL                       Value: 98(mg/dL)          Dt: 10/28/2022  VLDL Cholesterol Calcula* Value: 12(mg/dL)          Dt: 10/28/2022  LDL Calculated            Value: 115(mg/dL) (H)     Dt: 10/28/2022  ------------ - 2 weeks      Recent ER visit:  Date of visit 10/12/2022  ER notes were reviewed  Patient apparently was playing with the dog see fell backwards  See had pain in the tailbone  She does have a previous history of osteoporosis diagnosed at age 63  Patient presented with the low back pain  ER physician's finding noted  Patient was tender in the sacrum  Lung nodule: Incidental finding during workup for the palpitations possibly on the chest x-ray sometime in March 2022 03/31/2022:  CT scan of the chest:  6 mm nodule at the base of the left lower lobe at costophrenic angle  Peripheral interstitial opacities in the bilateral lower lung zone suggestive of mild fibrotic changes  Other than that CT scan of the chest was unremarkable  10/07/2022:  Surveillance follow-up CT scan of the chest:  1 cm nodule in the left lobe of the thyroid gland incidental finding, unchanged 6 mm subpleural left lower lobe nodule, degenerative changes of the spine, hepatics cyst-too small to characterize  Remote history of smoking quit 30 years ago, patient started smoking at age 14 and quit at age 29   Patient smoke a pack a day for altogether 14 years that is 7 pack year     Follow-up CT scan is already set up for the lung nodule in 6 months  Thyroid nodule: Incidental finding of thyroid nodule during CT scan of the chest as a part of the surveillance 6 mm subpleural left lower lobe nodule done on 10/07/2022:  10/07/2022:  CT scan of the chest revealed likely 1 cm nodule within the left lobe of the thyroid gland  Thyroid nodule without suspicious feature 1-1 4 cm in patient's of 35 year or greater  According to them no further workup is recommended  Patient is being scheduled for thyroid ultrasound by by endocrinologist   Patient will call and set up her own appointment      Osteoporosis:  Patient had a DEXA scan done on 03/31/2022:  Lumbar spine T-score-3 3:, left femoral neck T-score-1 4, left total hip T-score -0 7; this is the 1st DEXA scan  He patient is family had a history of adverse effect to the osteoporosis medications patient like to try nonpharmacological treatment  Patient likes her gynecologist and will be doing follow-up DEX scan back in 2 years          Left:  04/27/2022:  Free T4 0 79, TSH 12 5, PTH 35, magnesium 2 2, phosphorus 3 8, free T3 3 5, free T4 1 28, vitamin-D 25 hydroxy 56, CBC normal, CMP normal, blood sugar 100, celiac panel negative,      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  No previous hospitalization  Patient used to ski before in the past   Patient does started smoking at age 14 quit at age 29 half half pack a day total 7 pack year  One brother ,  Divorsed, no children    Hobbies:  Goes to gym, kayaking, has a dog        Patient is up-to-date with mammogram and sees gynecologist on regular basis    Patient is up-to-date with colonoscopy approximately 2 years ago was told to have a normal colonoscopy and follow-up colonoscopy in 10 years is recommended    Review of Systems   Constitutional: Negative for chills, fatigue, fever and unexpected weight change  HENT: Negative for ear pain, postnasal drip, sinus pain and sore throat  Eyes: Negative for pain and redness  Respiratory: Negative for cough and shortness of breath  Cardiovascular: Negative for chest pain, palpitations and leg swelling  Gastrointestinal: Positive for constipation  Negative for abdominal pain, diarrhea and nausea  Endocrine: Negative for cold intolerance, polydipsia and polyuria  Genitourinary: Negative for dysuria, frequency and urgency  Musculoskeletal: Negative for arthralgias, gait problem and myalgias  Skin: Negative for rash  Allergic/Immunologic: Negative  Neurological: Negative for dizziness and headaches  Hematological: Negative for adenopathy  Psychiatric/Behavioral: Negative for behavioral problems  Past Medical History:   Diagnosis Date   • Allergic    • Arthritis    • COVID-19    • Hypothyroidism 2021   • Osteoporosis      Past Surgical History:   Procedure Laterality Date   • CYST REMOVAL      From foot   • TONSILLECTOMY       History reviewed  No pertinent family history  Social History     Socioeconomic History   • Marital status: Single     Spouse name: None   • Number of children: None   • Years of education: None   • Highest education level: None   Occupational History   • None   Tobacco Use   • Smoking status: Former   • Smokeless tobacco: Never   Vaping Use   • Vaping Use: Never used   Substance and Sexual Activity   • Alcohol use:  Yes     Alcohol/week: 2 0 standard drinks     Types: 2 Glasses of wine per week     Comment: occ   • Drug use: Never   • Sexual activity: None   Other Topics Concern   • None   Social History Narrative   • None     Social Determinants of Health     Financial Resource Strain: Not on file   Food Insecurity: Not on file   Transportation Needs: Not on file   Physical Activity: Not on file   Stress: Not on file   Social Connections: Not on file   Intimate Partner Violence: Not on file   Housing Stability: Not on file     Current Outpatient Medications on File Prior to Visit   Medication Sig   • [DISCONTINUED] levothyroxine 75 mcg tablet    • [DISCONTINUED] levothyroxine 25 mcg tablet One and a half tablets daily (37 5mg total) daily     Allergies   Allergen Reactions   • Codeine Other (See Comments)     hypersensitivity     Immunization History   Administered Date(s) Administered   • COVID-19 MODERNA VACC 0 25 ML IM BOOSTER 12/07/2021   • COVID-19 MODERNA VACC 0 5 ML IM 04/07/2021, 05/05/2021       Objective     /76   Pulse 74   Ht 5' 4" (1 626 m)   Wt 65 8 kg (145 lb)   SpO2 98%   BMI 24 89 kg/m²     Physical Exam  Constitutional:       General: She is not in acute distress  Appearance: She is well-developed  She is not ill-appearing or diaphoretic  HENT:      Head: Normocephalic and atraumatic  Right Ear: External ear normal       Left Ear: External ear normal    Eyes:      General: Lids are normal          Right eye: No discharge  Left eye: No discharge  Conjunctiva/sclera: Conjunctivae normal    Neck:      Thyroid: No thyroid mass or thyromegaly  Vascular: No carotid bruit or JVD  Trachea: Trachea normal    Cardiovascular:      Rate and Rhythm: Regular rhythm  Heart sounds: Normal heart sounds  Pulmonary:      Effort: No respiratory distress  Breath sounds: Normal breath sounds  No wheezing or rales  Abdominal:      General: Bowel sounds are normal  There is no distension  Palpations: There is no mass  Tenderness: There is no rebound  Musculoskeletal:      Right lower leg: No edema  Left lower leg: No edema  Lymphadenopathy:      Cervical: No cervical adenopathy  Skin:     General: Skin is warm  Coloration: Skin is not jaundiced or pale  Findings: No rash  Neurological:      Mental Status: She is alert        Coordination: Coordination normal    Psychiatric:         Behavior: Behavior normal          Judgment: Judgment normal        José Miguel Merritt MD

## 2022-11-23 NOTE — PATIENT INSTRUCTIONS
Increase your levothyroxine 75 mcg daily as we discussed  You will need a follow-up TSH back in 6 weeks  I am giving you name of the endocrinologist:  Set up an appointment  We are also ordering ultrasound of the thyroid for the surveillance back in 6 months  We have already ordered the CT scan of the chest in 6 months for your lung nodule surveillance  Follow with Consultants as per their and our suggestion    Follow up in 2 months or as needed earlier    Follow all instructions as advised and discussed  Take your medications as prescribed  Call the office immediately if you experience any side effects  Ask questions if you do not understand  Keep your scheduled appointment as advised or come sooner if necessary or in doubt  Best time to call for non-urgent matter or questions on weekdays is between 9am and 12 noon  See physician for any new symptoms or worsening of current symptoms  Urgent or emergent situations call 911 and report to nearest emergency room      I spent  30+ minutes taking care of this patient including clinical care, conseling, collaboration, chart, lab and consultaion review as appropriate    Patient is to get labs 1 week(s) prior to next visit if advised

## 2022-11-23 NOTE — ASSESSMENT & PLAN NOTE
April 27, 2022:  TSH 12 5,:  Most recent TSH 5 range  Previous dose of levothyroxine 37 mcg  Recommended dose by endocrinologist was in the range of 100-112  Previous history of sensitivity to the levothyroxine  Paragraphs interim does a acceptable will start with 75 mcg which is already done couple of days ago  Of a repeat TSH to be monitored in 6 weeks home close surveillance both clinically as well as by the lab  I will see her back in 2 months  Also meanwhile referring to endocrinologist for their input

## 2023-01-03 ENCOUNTER — OFFICE VISIT (OUTPATIENT)
Dept: PODIATRY | Facility: CLINIC | Age: 63
End: 2023-01-03

## 2023-01-03 VITALS — RESPIRATION RATE: 17 BRPM | HEIGHT: 64 IN | WEIGHT: 145 LBS | BODY MASS INDEX: 24.75 KG/M2

## 2023-01-03 DIAGNOSIS — M21.961 ACQUIRED DEFORMITY OF RIGHT FOOT: ICD-10-CM

## 2023-01-03 DIAGNOSIS — M67.40 MUCOID CYST OF JOINT: Primary | ICD-10-CM

## 2023-01-03 NOTE — PROGRESS NOTES
Assessment/Plan:   pain upon ambulation   Mucoid cyst 4th right  toe    Acquired deformity of foot       Plan   Foot exam performed   Patient educated on condition   Right 4th toe lesion debrided   Phenol applied   In addition, silver nitrate applied as well   Patient may need surgical excision of lesion          Diagnoses and all orders for this visit:     Mucoid cyst of joint , 4th right toe     Onychomycosis      acquired deformity of foot bilateral                Subjective:   Patient presents for evaluation   She is doing better  Jolene Fontenot is taking Lamisil without problem   She has a small cyst developing on her 4th right toe      No Known Allergies        Current Outpatient Medications:   •  [place] Thyroid 90 MG tablet, , Disp: , Rfl:   •  levothyroxine (Synthroid) 137 mcg tablet, Take by mouth, Disp: , Rfl:   •  terbinafine (LamISIL) 250 mg tablet, Take 1 tablet (250 mg total) by mouth daily, Disp: 30 tablet, Rfl: 0     There is no problem list on file for this patient             Patient ID: Florina Storm is a 62 y o  female      HPI     The following portions of the patient's history were reviewed and updated as appropriate:      family history is not on file        has no history on file for tobacco, alcohol, and drug         Objective:  Patient's shoes removed  By patient         Foot ExamPhysical Exam          General  General Appearance: appears stated age and healthy   Orientation: alert and oriented to person, place, and time   Affect: appropriate   Gait: unimpaired         Right Foot/Ankle      Inspection and Palpation  Swelling: dorsum   Arch: pes planus  Hallux valgus: yes     Neurovascular  Dorsalis pedis: 3+  Posterior tibial: 3+        Left Foot/Ankle       Inspection and Palpation  Tenderness: bony tenderness   Swelling: dorsum   Arch: pes planus  Hallux valgus: yes  Skin Exam: skin changes;      Neurovascular  Dorsalis pedis: 3+  Posterior tibial: 3+           Physical Exam  Vitals signs and nursing note reviewed  Constitutional:       Appearance: Normal appearance  Cardiovascular:      Rate and Rhythm: Normal rate and regular rhythm       Pulses:           Dorsalis pedis pulses are 3+ on the right side and 3+ on the left side         Posterior tibial pulses are 3+ on the right side and 3+ on the left side  Musculoskeletal:      Right foot: Bunion present       Left foot: Bony tenderness and bunion present  Skin:     Comments:      DIPJ 4th right toe demonstrates small mucoid cyst   Incision and drainage done   Gel noted   No evidence of infection        Neurological:      Mental Status: She is alert  Psychiatric:         Mood and Affect: Mood normal          BehaviorCatherene [place]         Thought Content:  Thought content normal          Judgment: Judgment normal

## 2023-01-06 LAB — TSH SERPL DL<=0.005 MIU/L-ACNC: 1.3 UIU/ML (ref 0.45–4.5)

## 2023-01-09 ENCOUNTER — CONSULT (OUTPATIENT)
Dept: ENDOCRINOLOGY | Facility: CLINIC | Age: 63
End: 2023-01-09

## 2023-01-09 VITALS
HEART RATE: 64 BPM | HEIGHT: 64 IN | WEIGHT: 148 LBS | SYSTOLIC BLOOD PRESSURE: 120 MMHG | DIASTOLIC BLOOD PRESSURE: 82 MMHG | BODY MASS INDEX: 25.27 KG/M2

## 2023-01-09 DIAGNOSIS — E03.9 ACQUIRED HYPOTHYROIDISM: ICD-10-CM

## 2023-01-09 DIAGNOSIS — M81.0 OSTEOPOROSIS, UNSPECIFIED OSTEOPOROSIS TYPE, UNSPECIFIED PATHOLOGICAL FRACTURE PRESENCE: Primary | ICD-10-CM

## 2023-01-09 DIAGNOSIS — E04.1 THYROID NODULE: ICD-10-CM

## 2023-01-09 RX ORDER — LEVOTHYROXINE SODIUM 0.07 MG/1
75 TABLET ORAL
Qty: 90 TABLET | Refills: 1 | Status: SHIPPED | OUTPATIENT
Start: 2023-01-09

## 2023-01-09 NOTE — PROGRESS NOTES
Phi Brown 63 y o  female MRN: [id number]    Encounter: [id number]      Assessment/Plan     1  Hypothyroidism - biochemically & clinically euthyroid on present dosing of levothyroxine 75 mcg daily  Will arrange repeat thyroid testing in 6-8 weeks to confirm stability  2  Thyroid nodule - left sided N1 TR3 1 6 cm  Last US 10/29/2022  Will plan for follow up study in 6-12 months for surveillance  3  Osteoporosis - new problem  Patient with history of severe osteoporosis of lumbar spine  She has history of wrist fracture and a possible occult fracture of the coccyx based on recent XR imaging  Patient hesitant to pursue pharmacologic treatment of osteoporosis, preferring strategy of optimization of diet and activity, in addition to calcium and vitamin D supplementation  I would like to revisit this diagnosis in more detail with patient at near future visit  In meantime, will request preliminary labs prior to that visit  Would also consider XR evaluation of thoracolumbar spine for r/o occult vertebral fracture  Problem List Items Addressed This Visit        Endocrine    Acquired hypothyroidism    Relevant Medications    levothyroxine 75 mcg tablet    Other Relevant Orders    T4, free Lab Collect    TSH, 3rd generation Lab Collect    Thyroid nodule    Relevant Medications    levothyroxine 75 mcg tablet   Other Visit Diagnoses     Osteoporosis, unspecified osteoporosis type, unspecified pathological fracture presence    -  Primary    Relevant Orders    Vitamin D 25 hydroxy Lab Collect    PTH, intact Lab Collect Lab Collect    Comprehensive metabolic panel Lab Collect    Phosphorus Lab Collect        RTC 2-months    CC: Hypothyroidism, thyroid nodule    History of Present Illness     HPI:    Dereck Cardoso presents for evaluation of hypothyroidism, thyroid nodule  For hypothyroidism, patient presently on generic levothyroxine 75 mcg daily  Her dose was increased from 50 mcg daily 6-weeks ago   Her dose has been gradually increased over time  She has experience with armour thyroid in the past  Patient reports constipation, otherwise no convincing thyroidal symptoms  She reports history of thyroid incidentaloma from CT scan chest Oct 2022  Patient's mother with history of thyroid cyst s/p partial thyroidectomy  No family history of thyroid cancer  Patient without history of radiation exposure to head or neck during childhood or adolescence  Patient reports history of gluten intolerance  Patient also with history of osteoporosis  Last DXA March 2022 showed osteoporosis lumbar spine (T -3 3)  Patient with history of wrist fracture that occurred after striking hand against pole  More recently, she had a XR sacrum and coccyx which showed mild distal coccygeal deformity possibly representing occult fracture or old injury  No fall history  She does note loss of adult height 5'6" to 5'4"  No back pain  Mom with history of osteoporosis who was treated with reclast  Patient comments that reclast led to worsening dementia in patient's mother  Patient reports mother with diagnosis of Lewy Body Dementia  Patient has concern for SE of treatments and really doesn't want to go on medications  She prefers conservative MGMT of osteoporosis  She is taking caltrate 600 mg bid plus D3 5k units daily (on most days)  She goes to the gym and participates in weight training  Menopause was around age 48y  Patient was on bioidentical HRT during ana-menopause in 45s, stopped in early 46s  Periods historically regular, like a clock  No history of chronic glucocorticoid exposure  No personal history of kidney stones  No EtOh, tobacco      Review of Systems   Constitutional: Negative for chills and unexpected weight change  HENT: Negative for trouble swallowing and voice change  Cardiovascular: Negative for palpitations  Gastrointestinal: Positive for constipation  Negative for nausea and vomiting     Endocrine: Negative for cold intolerance, heat intolerance, polydipsia and polyuria  Musculoskeletal: Negative for back pain and gait problem  Neurological: Negative for tremors  Psychiatric/Behavioral: Negative for agitation  All other systems reviewed and are negative  Historical Information   Past Medical History:   Diagnosis Date   • Allergic    • Arthritis    • COVID-19    • Hypothyroidism 2021   • Osteoporosis      Past Surgical History:   Procedure Laterality Date   • CYST REMOVAL      From foot   • TONSILLECTOMY       Social History   Social History     Substance and Sexual Activity   Alcohol Use Yes   • Alcohol/week: 2 0 standard drinks   • Types: 2 Glasses of wine per week    Comment: occ     Social History     Substance and Sexual Activity   Drug Use Never     Social History     Tobacco Use   Smoking Status Former   Smokeless Tobacco Never     Family History:   Family History   Problem Relation Age of Onset   • Osteoporosis Mother    • Hypothyroidism Mother        Meds/Allergies   Current Outpatient Medications   Medication Sig Dispense Refill   • levothyroxine 75 mcg tablet Take 1 tablet (75 mcg total) by mouth daily in the early morning 90 tablet 1     No current facility-administered medications for this visit  Allergies   Allergen Reactions   • Codeine Other (See Comments)     hypersensitivity       Objective   Vitals: Blood pressure 120/82, pulse 64, height 5' 4" (1 626 m), weight 67 1 kg (148 lb)  Physical Exam  Vitals reviewed  Constitutional:       Appearance: Normal appearance  HENT:      Head: Normocephalic and atraumatic  Nose: Nose normal    Eyes:      General: No scleral icterus  Conjunctiva/sclera: Conjunctivae normal    Neck:      Thyroid: No thyroid mass, thyromegaly or thyroid tenderness  Cardiovascular:      Rate and Rhythm: Normal rate and regular rhythm  Pulmonary:      Effort: Pulmonary effort is normal  No respiratory distress     Musculoskeletal:      Cervical back: Normal range of motion and neck supple  No rigidity  Lymphadenopathy:      Cervical: No cervical adenopathy  Skin:     General: Skin is warm and dry  Neurological:      General: No focal deficit present  Mental Status: She is alert  Comments: No tremor to outstretched hands   Psychiatric:         Mood and Affect: Mood normal          Behavior: Behavior normal          The history was obtained from the review of the chart, patient  Lab Results:      Lab Results   Component Value Date    TSH 1 300 01/05/2023         Imaging Studies:   Results for orders placed during the hospital encounter of 10/29/22    US thyroid    Impression  No nodule meets current ACR criteria for requiring biopsy but followup ultrasound is recommended in 1 year  Reference: ACR Thyroid Imaging, Reporting and Data System (TI-RADS): White Paper of the [employer]  J AM Gloria Radiol 8871;27:260-445  (additional recommendations based on American Thyroid Association 2015 guidelines )      Workstation performed: [id number]        I have personally reviewed pertinent reports  and I have personally reviewed pertinent films in PACS  Thyroid US shows dominant nodule measuring 1 6cm, mostly solid but with peripheral cystic component  The solid portion is isoechoic with normal shape, margins, and absence of echogenic foci  This nodule is TR3  Follow up [address] [facility] can be considered in 12-months  Portions of the record may have been created with voice recognition software  Occasional wrong word or "sound a like" substitutions may have occurred due to the inherent limitations of voice recognition software  Read the chart carefully and recognize, using context, where substitutions have occurred

## 2023-01-20 ENCOUNTER — TELEPHONE (OUTPATIENT)
Dept: OTHER | Facility: OTHER | Age: 63
End: 2023-01-20

## 2023-01-20 NOTE — TELEPHONE ENCOUNTER
Patient is calling regarding cancelling an appointment      Date/Time: 1 23 2023 Dr Mk Ramirez internal med     Patient was rescheduled: YES [] NO [x]    Patient requesting call back to reschedule: YES [x] NO []

## 2023-01-26 ENCOUNTER — OFFICE VISIT (OUTPATIENT)
Dept: INTERNAL MEDICINE CLINIC | Facility: CLINIC | Age: 63
End: 2023-01-26

## 2023-01-26 ENCOUNTER — TELEPHONE (OUTPATIENT)
Dept: OBGYN CLINIC | Facility: HOSPITAL | Age: 63
End: 2023-01-26

## 2023-01-26 VITALS
DIASTOLIC BLOOD PRESSURE: 80 MMHG | HEART RATE: 71 BPM | RESPIRATION RATE: 15 BRPM | TEMPERATURE: 98 F | SYSTOLIC BLOOD PRESSURE: 116 MMHG | OXYGEN SATURATION: 99 % | HEIGHT: 64 IN | BODY MASS INDEX: 25.44 KG/M2 | WEIGHT: 149 LBS

## 2023-01-26 DIAGNOSIS — M67.449 GANGLION CYST OF FINGER: ICD-10-CM

## 2023-01-26 DIAGNOSIS — M81.0 AGE-RELATED OSTEOPOROSIS WITHOUT CURRENT PATHOLOGICAL FRACTURE: ICD-10-CM

## 2023-01-26 DIAGNOSIS — E04.1 THYROID NODULE: Primary | ICD-10-CM

## 2023-01-26 DIAGNOSIS — R91.1 LUNG NODULE: ICD-10-CM

## 2023-01-26 DIAGNOSIS — E03.9 ACQUIRED HYPOTHYROIDISM: ICD-10-CM

## 2023-01-26 NOTE — ASSESSMENT & PLAN NOTE
January 9, 2023 endocrine consult noted  Patient is clinically euthyroid  Endocrinologist agreed to renew her oral 75 mcg and follow-up TSH and thyroid function study to be done in 6 6 to 8 weeks endocrinologist also added PTH vitamin D CMP and other studies

## 2023-01-26 NOTE — ASSESSMENT & PLAN NOTE
Endocrinologist note from 1/9/2023 reviewed abstract as follows    Patient with history of severe osteoporosis of lumbar spine    She has history of wrist fracture and a possible occult fracture of the coccyx based on recent XR imaging  Patient prefers to hold off treatment for osteoporosis  Endocrinologist has ordered additional testing and they will revisit and reevaluate this issue after additional studies    will continue to optimization of diet and activity, in addition to calcium and vitamin D supplementation

## 2023-01-26 NOTE — ASSESSMENT & PLAN NOTE
Seen by nephrologist   This is in his mid to do surveillance thyroid nodule  Abstract from that note as follows  left sided N1 TR3 1 6 cm  Last US 10/29/2022  Will plan for follow up study in 6-12 months for surveillance  Compressive symptoms at this time  Thyroid ultrasound is already ordered

## 2023-01-26 NOTE — PROGRESS NOTES
Name: Facundo Christian      : 1960      MRN: [id number]  Encounter Provider: Harriett Sandhu MD  Encounter Date: 2023   Encounter department: [address]     1  Thyroid nodule  Assessment & Plan:  Seen by nephrologist   This is in his mid to do surveillance thyroid nodule  Abstract from that note as follows  left sided N1 TR3 1 6 cm  Last US 10/29/2022  Will plan for follow up study in 6-12 months for surveillance  Compressive symptoms at this time  Thyroid ultrasound is already ordered  2  Acquired hypothyroidism  Assessment & Plan:  2023 endocrine consult noted  Patient is clinically euthyroid  Endocrinologist agreed to renew her oral 75 mcg and follow-up TSH and thyroid function study to be done in 6 6 to 8 weeks endocrinologist also added PTH vitamin D CMP and other studies  3  Age-related osteoporosis without current pathological fracture  Assessment & Plan:  Endocrinologist note from 2023 reviewed abstract as follows    Patient with history of severe osteoporosis of lumbar spine    She has history of wrist fracture and a possible occult fracture of the coccyx based on recent XR imaging  Patient prefers to hold off treatment for osteoporosis  Endocrinologist has ordered additional testing and they will revisit and reevaluate this issue after additional studies    will continue to optimization of diet and activity, in addition to calcium and vitamin D supplementation  4  Lung nodule  Assessment & Plan:  Scan from 3/31/2022 as well as 10/7/2022 reviewed follow-up CT scans chest already ordered      5  Ganglion cyst of finger  Assessment & Plan:  Diagnosis noted  Will refer to the hand surgeon  Patient noticed ago  No obvious history of trauma  First finger beyond DIP between DIP and nail -size 4 to 5 mm    Orders:  -     Ambulatory referral to Hand Surgery; Future      BMI Counseling:  Body mass index is 25 58 kg/m²  The BMI is above normal  Nutrition recommendations include decreasing portion sizes, encouraging healthy choices of fruits and vegetables, decreasing fast food intake, consuming healthier snacks, moderation in carbohydrate intake and increasing intake of lean protein  Exercise recommendations include exercising 3-5 times per week  Rationale for BMI follow-up plan is due to patient being overweight or obese  Subjective     Follow-up visit  Hypothyroidism: Seen by endocrinologist currently on levothyroxine 75 mcg daily patient is euthyroid clinically follow-up TSH is being planned  Patient reported to previous note about patient being sensitive to the dose  Thyroid nodule surveillance ultrasound is being planned no compressive symptoms endocrinologist noted noted  Osteoporosis extensive discussion and not by endocrinologist noted  Patient prefers not to be on medications  Studies being done  Lung nodule follow-up CAT scan is being planned      Lung nodule: Incidental finding during workup for the palpitations possibly on the chest x-ray sometime in March 2022 03/31/2022:  CT scan of the chest:  6 mm nodule at the base of the left lower lobe at costophrenic angle  Peripheral interstitial opacities in the bilateral lower lung zone suggestive of mild fibrotic changes  Other than that CT scan of the chest was unremarkable  10/07/2022:  Surveillance follow-up CT scan of the chest:  1 cm nodule in the left lobe of the thyroid gland incidental finding, unchanged 6 mm subpleural left lower lobe nodule, degenerative changes of the spine, hepatics cyst-too small to characterize  Remote history of smoking quit 30 years ago, patient started smoking at age 14 and quit at age 29  Patient smoke a pack a day for altogether 14 years that is 7 pack year     Follow-up CT scan is already set up for the lung nodule in 6 months  10/28/2022: Blood sugar 102           caregiver stress history reviewed/ supporting family member with dementia were reviewed at length                  Lung nodule: Incidental finding during workup for the palpitations possibly on the chest x-ray sometime in March 2022 03/31/2022:  CT scan of the chest:  6 mm nodule at the base of the left lower lobe at costophrenic angle  Peripheral interstitial opacities in the bilateral lower lung zone suggestive of mild fibrotic changes  Other than that CT scan of the chest was unremarkable  10/07/2022:  Surveillance follow-up CT scan of the chest:  1 cm nodule in the left lobe of the thyroid gland incidental finding, unchanged 6 mm subpleural left lower lobe nodule, degenerative changes of the spine, hepatics cyst-too small to characterize  Remote history of smoking quit 30 years ago, patient started smoking at age 14 and quit at age 29  Patient smoke a pack a day for altogether 14 years that is 7 pack year     Follow-up CT scan is already set up for the lung nodule in 6 months  Thyroid nodule: Incidental finding of thyroid nodule during CT scan of the chest as a part of the surveillance 6 mm subpleural left lower lobe nodule done on 10/07/2022:  10/07/2022:  CT scan of the chest revealed likely 1 cm nodule within the left lobe of the thyroid gland  Thyroid nodule without suspicious feature 1-1 4 cm in patient's of 35 year or greater  According to them no further workup is recommended  Patient is being scheduled for thyroid ultrasound by by endocrinologist   Patient will call and set up her own appointment      Osteoporosis:  Patient had a DEXA scan done on 03/31/2022:  Lumbar spine T-score-3 3:, left femoral neck T-score-1 4, left total hip T-score -0 7; this is the 1st DEXA scan  He patient is family had a history of adverse effect to the osteoporosis medications patient like to try nonpharmacological treatment    Patient likes her gynecologist and will be doing follow-up DEX scan back in 2 years        Patient had hepatitis C test done she will get us the copy  Not interested in HIV screening  Is gynecologist regularly  Up-to-date with mammogram and colonoscopy  Recommend Tdap  COVID-vaccine  Not interested in influenza vaccines        : New complaint: Small cyst on the first finger beyond DIP and nail  No obvious injury  Noticed 2 to 3 weeks ago  Appears between dermis and epidermis 45 mm  Clear translucent fluid present    Review of Systems   Constitutional: Negative for appetite change, fatigue, fever and unexpected weight change  HENT: Negative for congestion, ear pain and sore throat  Eyes: Negative for pain and redness  Respiratory: Negative for cough and shortness of breath  Cardiovascular: Negative for chest pain, palpitations and leg swelling  Gastrointestinal: Negative for abdominal pain, diarrhea, nausea and vomiting  Endocrine: Negative for cold intolerance, polydipsia and polyuria  Genitourinary: Negative for dysuria, frequency and urgency  Musculoskeletal: Negative for arthralgias, gait problem and myalgias  Skin: Negative for rash  Allergic/Immunologic: Negative  Neurological: Negative for dizziness and headaches  Hematological: Negative for adenopathy  Psychiatric/Behavioral: Negative for behavioral problems         Past Medical History:   Diagnosis Date   • Allergic    • Arthritis    • COVID-19    • Hypothyroidism 2021   • Osteoporosis      Past Surgical History:   Procedure Laterality Date   • CYST REMOVAL      From foot   • TONSILLECTOMY       Family History   Problem Relation Age of Onset   • Osteoporosis Mother    • Hypothyroidism Mother      Social History     Socioeconomic History   • Marital status: Single     Spouse name: None   • Number of children: None   • Years of education: None   • Highest education level: None   Occupational History   • None   Tobacco Use   • Smoking status: Former   • Smokeless tobacco: Never   Vaping Use   • Vaping Use: Never used   Substance and Sexual Activity   • Alcohol use: Yes     Alcohol/week: 2 0 standard drinks     Types: 2 Glasses of wine per week     Comment: occ   • Drug use: Never   • Sexual activity: None   Other Topics Concern   • None   Social History Narrative   • None     Social Determinants of Health     Financial Resource Strain: Not on file   Food Insecurity: Not on file   Transportation Needs: Not on file   Physical Activity: Not on file   Stress: Not on file   Social Connections: Not on file   Intimate Partner Violence: Not on file   Housing Stability: Not on file     Current Outpatient Medications on File Prior to Visit   Medication Sig   • levothyroxine 75 mcg tablet Take 1 tablet (75 mcg total) by mouth daily in the early morning     Allergies   Allergen Reactions   • Codeine Other (See Comments)     hypersensitivity     Immunization History   Administered Date(s) Administered   • COVID-19 MODERNA VACC 0 25 ML IM BOOSTER 12/07/2021   • COVID-19 MODERNA VACC 0 5 ML IM 04/07/2021, 05/05/2021   • COVID-19 Moderna Vac BIVALENT 12 Yr+ IM (BOOSTER ONLY) 0 5 ML 11/03/2022       Objective     /80   Pulse 71   Temp 98 °F (36 7 °C)   Resp 15   Ht 5' 4" (1 626 m)   Wt 67 6 kg (149 lb)   SpO2 99%   BMI 25 58 kg/m²     Physical Exam  Constitutional:       General: She is not in acute distress  Appearance: She is well-developed  She is not ill-appearing or diaphoretic  HENT:      Head: Normocephalic and atraumatic  Right Ear: External ear normal       Left Ear: External ear normal    Eyes:      General: No scleral icterus  Right eye: No discharge  Left eye: No discharge  Conjunctiva/sclera: Conjunctivae normal    Neck:      Thyroid: No thyromegaly  Vascular: No JVD  Cardiovascular:      Rate and Rhythm: Regular rhythm  Heart sounds: Normal heart sounds  Pulmonary:      Effort: No respiratory distress  Breath sounds: Normal breath sounds   No wheezing or rales  Musculoskeletal:      Comments: Is first finger  4 to 5 mm ganglionic or cystic cyst present between the DIP and nail   Lymphadenopathy:      Cervical: No cervical adenopathy  Skin:     Coloration: Skin is not jaundiced or pale         Ana Wong MD

## 2023-01-26 NOTE — ASSESSMENT & PLAN NOTE
Diagnosis noted  Will refer to the hand surgeon  Patient noticed ago  No obvious history of trauma      First finger beyond DIP between DIP and nail -size 4 to 5 mm

## 2023-01-26 NOTE — TELEPHONE ENCOUNTER
Patient is being referred to a orthopedics  Please schedule accordingly      [facility] [address]   [phone]

## 2023-01-26 NOTE — PATIENT INSTRUCTIONS
Make arrangement for Hand surgeon to see for your finger    Go for US of thyroid and CT scan chest as planned    Blood test as recommended by Endocrinologist    Follow with Consultants as per their and our suggestion    Follow up in 12 week(s) or as needed earlier    Follow all instructions as advised and discussed  Take your medications as prescribed  Call the office immediately if you experience any side effects  Ask questions if you do not understand  Keep your scheduled appointment as advised or come sooner if necessary or in doubt  Best time to call for non-urgent matter or questions on weekdays is between 9am and 12 noon  See physician for any new symptoms or worsening of current symptoms  Urgent or emergent situations call 911 and report to nearest emergency room      I spent  30 -40 minutes taking care of this patient including clinical care, conseling, collaboration, chart, lab and consultaion review as appropriate    Patient is to get labs 1 week(s) prior to next visit if advised

## 2023-02-21 ENCOUNTER — APPOINTMENT (OUTPATIENT)
Dept: RADIOLOGY | Facility: CLINIC | Age: 63
End: 2023-02-21

## 2023-02-21 ENCOUNTER — OFFICE VISIT (OUTPATIENT)
Dept: OBGYN CLINIC | Facility: CLINIC | Age: 63
End: 2023-02-21

## 2023-02-21 VITALS
TEMPERATURE: 98 F | HEART RATE: 62 BPM | BODY MASS INDEX: 25.58 KG/M2 | DIASTOLIC BLOOD PRESSURE: 70 MMHG | SYSTOLIC BLOOD PRESSURE: 108 MMHG | HEIGHT: 64 IN

## 2023-02-21 DIAGNOSIS — M67.449 GANGLION CYST OF FINGER: ICD-10-CM

## 2023-02-21 DIAGNOSIS — M79.641 PAIN IN BOTH HANDS: ICD-10-CM

## 2023-02-21 DIAGNOSIS — M79.642 PAIN IN BOTH HANDS: ICD-10-CM

## 2023-02-21 DIAGNOSIS — M67.40 MUCOID CYST OF JOINT: ICD-10-CM

## 2023-02-21 DIAGNOSIS — M19.049 HAND ARTHRITIS: Primary | ICD-10-CM

## 2023-02-21 NOTE — PROGRESS NOTES
Chief Complaint     Left index finger mass       History of Present Illness     Clarisa Caro is a 63 y o  right hand dominant female who presents to the office for a left index finger mass  I am seeing Juan Pablo Angela in consultation at the request of Dr Tripp Desai  She notes a mass to her left index finger DIP joint  She notes that this has been present for a while and is not painful for her  She also notes that her right index finger index and long finger DIP joints are starting to radial deviate  She notes that she did have lab work done by her PCP, which did not show anything concerning  Again this is not painful for her she just notes that she does not like how her fingers look  Past Medical History:   Diagnosis Date   • Allergic    • Arthritis    • COVID-19    • Disease of thyroid gland     [place] 90 mg and Synthyroid 25 mcg   • Hypothyroidism    • Osteoporosis        Past Surgical History:   Procedure Laterality Date   • CYST REMOVAL      From foot   • FOOT SURGERY      cyst removed, top of foot   • TONSILLECTOMY         Allergies   Allergen Reactions   • Codeine Other (See Comments)     hypersensitivity       Current Outpatient Medications on File Prior to Visit   Medication Sig Dispense Refill   • levothyroxine 75 mcg tablet Take 1 tablet (75 mcg total) by mouth daily in the early morning 90 tablet 1     No current facility-administered medications on file prior to visit  Social History     Tobacco Use   • Smoking status: Former     Packs/day: 0 50     Years: 10 00     Pack years: 5 00     Types: Cigarettes     Start date: 1978     Quit date: 1988     Years since quittin 1   • Smokeless tobacco: Never   • Tobacco comments:     quit over 30 years ago   Vaping Use   • Vaping Use: Never used   Substance Use Topics   • Alcohol use:  Yes     Alcohol/week: 3 0 standard drinks     Types: 3 Glasses of wine per week     Comment: occ   • Drug use: Never       Family History Problem Relation Age of Onset   • Osteoporosis Mother    • Hypothyroidism Mother        Review of Systems     As stated in the HPI  All other systems were reviewed and are negative  Physical Exam     /70   Pulse 62   Temp 98 °F (36 7 °C)   Ht 5' 4" (1 626 m)   BMI 25 58 kg/m²     GENERAL: This is a well-developed, well-nourished, age-appropriate patient in no acute distress  The patient is alert and oriented x3  Pleasant and cooperative  Eyes: Anicteric sclerae  Extraocular movements appear intact  HENT: Nares are patent with no drainage  Lungs: There is equal chest rise on inspection  Breathing is non-labored with no audible wheezing  Cardiovascular: No cyanosis  No upper extremity lymphadema  Skin: Skin is warm to touch  No obvious skin lesions or rashes other than described below  Neurologic: No ataxia  Psychiatric: Mood and affect are appropriate  Ortho Exam   Bilateral hand:   No erythema, ecchymosis or edema  Mucoid cyst to left index finger DIP joint   Radial deviation of the right index and long finger DIP joints   Full extension of all digits   Full composite fist   5/5  strength   Flexor and extensor strength 5/5   No pain with resisted wrist flexion on the left   FCR non tender to palpation on the left   Thumb CMC joints non tender to palpation bilaterally     Data Review     Labs:  None    Electrodiagnostic Testing:  None    Imaging: I personally reviewed x-rays obtained in the office today which included 3 views of each hand  X-rays of bilateral hands demonstrate degenerative changes at the IP joints of multiple fingers and at bilateral thumb CMC joints  Assessment and Plan      Diagnoses and all orders for this visit:    Mucoid cyst of joint    Ganglion cyst of finger  -     Ambulatory referral to Hand Surgery    Hand arthritis    Pain in both hands  -     XR hand 3+ vw left;  Future  -     XR hand 3+ vw right; Future           63 y o  female with a left index finger mucoid cyst and right index/long finger DIP joint arthritis  The etiology of above diagnosis was discussed along with treatment options  Joint preservation was discussed  She was advised to keep her fingers moving and to maintain her hand ROM  She may also benefit from warm moist heat or Paraffin wax treatment  We also discussed silipos sleeves off of [address] for compression, which can make the cysts go away  She was fitted with bilateral comfort cool braces to be worn during the day with activites  Follow up in the office as needed if symptoms worsen or fail to improve         Follow Up: PRN    To Do Next Visit: re-evaluation     PROCEDURES PERFORMED:  Procedures  No Procedures performed today      Scribe Attestation    I,:  Francesca Nyhan am acting as a scribe while in the presence of the attending physician :       I,:  Wenceslao Jimenez MD personally performed the services described in this documentation    as scribed in my presence :

## 2023-03-07 ENCOUNTER — LAB (OUTPATIENT)
Dept: LAB | Facility: CLINIC | Age: 63
End: 2023-03-07

## 2023-03-07 DIAGNOSIS — M81.0 OSTEOPOROSIS, UNSPECIFIED OSTEOPOROSIS TYPE, UNSPECIFIED PATHOLOGICAL FRACTURE PRESENCE: ICD-10-CM

## 2023-03-07 DIAGNOSIS — Z13.220 SCREENING FOR HYPERCHOLESTEROLEMIA: ICD-10-CM

## 2023-03-07 DIAGNOSIS — E03.9 ACQUIRED HYPOTHYROIDISM: ICD-10-CM

## 2023-03-07 DIAGNOSIS — R00.2 PALPITATION: ICD-10-CM

## 2023-03-07 DIAGNOSIS — E04.1 THYROID NODULE: ICD-10-CM

## 2023-03-07 LAB
25(OH)D3 SERPL-MCNC: 38.6 NG/ML (ref 30–100)
ALBUMIN SERPL BCP-MCNC: 3.7 G/DL (ref 3.5–5)
ALP SERPL-CCNC: 49 U/L (ref 46–116)
ALT SERPL W P-5'-P-CCNC: 21 U/L (ref 12–78)
ANION GAP SERPL CALCULATED.3IONS-SCNC: 7 MMOL/L (ref 4–13)
AST SERPL W P-5'-P-CCNC: 14 U/L (ref 5–45)
BILIRUB SERPL-MCNC: 0.36 MG/DL (ref 0.2–1)
BUN SERPL-MCNC: 15 MG/DL (ref 5–25)
CALCIUM SERPL-MCNC: 8.6 MG/DL (ref 8.3–10.1)
CHLORIDE SERPL-SCNC: 106 MMOL/L (ref 96–108)
CHOLEST SERPL-MCNC: 217 MG/DL
CO2 SERPL-SCNC: 26 MMOL/L (ref 21–32)
CREAT SERPL-MCNC: 0.58 MG/DL (ref 0.6–1.3)
GFR SERPL CREATININE-BSD FRML MDRD: 100 ML/MIN/1.73SQ M
GLUCOSE P FAST SERPL-MCNC: 97 MG/DL (ref 65–99)
HDLC SERPL-MCNC: 103 MG/DL
LDLC SERPL CALC-MCNC: 103 MG/DL (ref 0–100)
NONHDLC SERPL-MCNC: 114 MG/DL
PHOSPHATE SERPL-MCNC: 4.1 MG/DL (ref 2.3–4.1)
POTASSIUM SERPL-SCNC: 4.1 MMOL/L (ref 3.5–5.3)
PROT SERPL-MCNC: 6.7 G/DL (ref 6.4–8.4)
PTH-INTACT SERPL-MCNC: 81 PG/ML (ref 18.4–80.1)
SODIUM SERPL-SCNC: 139 MMOL/L (ref 135–147)
T4 FREE SERPL-MCNC: 1.09 NG/DL (ref 0.76–1.46)
TRIGL SERPL-MCNC: 57 MG/DL
TSH SERPL DL<=0.05 MIU/L-ACNC: 1.76 UIU/ML (ref 0.45–4.5)

## 2023-03-13 ENCOUNTER — OFFICE VISIT (OUTPATIENT)
Dept: ENDOCRINOLOGY | Facility: CLINIC | Age: 63
End: 2023-03-13

## 2023-03-13 VITALS
SYSTOLIC BLOOD PRESSURE: 110 MMHG | BODY MASS INDEX: 25.78 KG/M2 | DIASTOLIC BLOOD PRESSURE: 72 MMHG | WEIGHT: 151 LBS | HEIGHT: 64 IN | HEART RATE: 85 BPM

## 2023-03-13 DIAGNOSIS — E04.1 THYROID NODULE: ICD-10-CM

## 2023-03-13 DIAGNOSIS — E03.9 ACQUIRED HYPOTHYROIDISM: ICD-10-CM

## 2023-03-13 DIAGNOSIS — M81.0 OSTEOPOROSIS, UNSPECIFIED OSTEOPOROSIS TYPE, UNSPECIFIED PATHOLOGICAL FRACTURE PRESENCE: Primary | ICD-10-CM

## 2023-03-13 NOTE — PROGRESS NOTES
Meryle Roman 63 y o  female MRN: [id number]    Encounter: [id number]      Assessment/Plan     1  Hypothyroidism - biochemically & clinically euthyroid on present dosing of levothyroxine 75 mcg daily  May continue    2  Thyroid nodule - left sided N1 TR3 1 6 cm  Last US 10/29/2022  Will plan for follow up study in 6 months for surveillance  3  Osteoporosis, severe -Patient with history of severe osteoporosis of lumbar spine  She has history of wrist fracture and a possible occult fracture of the coccyx based on recent XR imaging  Patient hesitant to pursue pharmacologic treatment of osteoporosis, preferring strategy of optimization of diet and activity, in addition to calcium and vitamin D supplementation  Labs showed mild elevation of PTH with calcium on low end of normal, normal vitamin D 25-OH  Recommend increasing calcium carbonate 600 mg twice daily (may take with lunch and supper, avoid within 4-hours of thyroid medication)  Will also request XR of thoracolumbar spine 2 view to rule out subclinical vertebral fracture  I advised that the presence of       Problem List Items Addressed This Visit        Endocrine    Acquired hypothyroidism    Relevant Orders    T4, free Lab Collect    TSH, 3rd generation Lab Collect    Thyroid nodule    Relevant Orders    US thyroid   Other Visit Diagnoses     Osteoporosis, unspecified osteoporosis type, unspecified pathological fracture presence    -  Primary    Relevant Orders    XR spine thoracolumbar 2 vw    Comprehensive metabolic panel Lab Collect    PTH, intact Lab Collect Lab Collect    Phosphorus Lab Collect    Vitamin D 25 hydroxy Lab Collect        RTC 6-months    CC: Hypothyroidism, thyroid nodule    History of Present Illness     HPI:    Latoya Prakash returns today for follow up evaluation of hypothyroidism, thyroid nodule and osteoporosis  She is here today mostly to review labs, no acute concerns  For hypothyroidism, she remains on levothyroxine 75 mcg daily  She denies any hyper- or hypothyroid symptoms  She denies any compressive neck complaints  Patient also with history of osteoporosis  Last DXA March 2022 showed osteoporosis lumbar spine (T -3 3)  Patient with history of wrist fracture that occurred after striking hand against pole  More recently, she had a XR sacrum and coccyx which showed mild distal coccygeal deformity possibly representing occult fracture or old injury  No fall history  She does note loss of adult height 5'6" to 5'4"  No back pain  Mom with history of osteoporosis who was treated with reclast  Patient comments that reclast led to worsening dementia in patient's mother  Patient reports mother with diagnosis of Lewy Body Dementia  Patient has concern for SE of treatments and really doesn't want to go on medications  She prefers conservative MGMT of osteoporosis  She is taking caltrate 600 mg, mostly once daily  She is also on D3 5k units daily  She goes to the gym and participates in weight training  Menopause was around age 48y  Patient was on bioidentical HRT during ana-menopause in 45s, stopped in early 46s  Periods historically regular, like a clock  No history of chronic glucocorticoid exposure  No personal history of kidney stones  No EtOh, tobacco      Review of Systems   Constitutional: Negative for chills and unexpected weight change  HENT: Negative for trouble swallowing and voice change  Cardiovascular: Negative for palpitations  Gastrointestinal: Positive for constipation  Negative for nausea and vomiting  Endocrine: Negative for cold intolerance, heat intolerance, polydipsia and polyuria  Musculoskeletal: Negative for back pain and gait problem  Neurological: Negative for tremors  Psychiatric/Behavioral: Negative for agitation  All other systems reviewed and are negative        Historical Information   Past Medical History:   Diagnosis Date   • Allergic    • Arthritis    • COVID-19    • Disease of thyroid gland     [place] 90 mg and Synthyroid 25 mcg   • Hypothyroidism    • Osteoporosis      Past Surgical History:   Procedure Laterality Date   • CYST REMOVAL      From foot   • FOOT SURGERY      cyst removed, top of foot   • TONSILLECTOMY       Social History   Social History     Substance and Sexual Activity   Alcohol Use Yes   • Alcohol/week: 3 0 standard drinks   • Types: 3 Glasses of wine per week    Comment: occ     Social History     Substance and Sexual Activity   Drug Use Never     Social History     Tobacco Use   Smoking Status Former   • Packs/day: 0 50   • Years: 10 00   • Pack years: 5 00   • Types: Cigarettes   • Start date: 1978   • Quit date: 1988   • Years since quittin 2   Smokeless Tobacco Never   Tobacco Comments    quit over 30 years ago     Family History:   Family History   Problem Relation Age of Onset   • Osteoporosis Mother    • Hypothyroidism Mother        Meds/Allergies   Current Outpatient Medications   Medication Sig Dispense Refill   • levothyroxine 75 mcg tablet Take 1 tablet (75 mcg total) by mouth daily in the early morning 90 tablet 1     No current facility-administered medications for this visit  Allergies   Allergen Reactions   • Codeine Other (See Comments)     hypersensitivity       Objective   Vitals: Blood pressure 110/72, pulse 85, height 5' 4" (1 626 m), weight 68 5 kg (151 lb)  Physical Exam  Vitals reviewed  Constitutional:       Appearance: Normal appearance  HENT:      Head: Normocephalic and atraumatic  Nose: Nose normal    Eyes:      General: No scleral icterus  Conjunctiva/sclera: Conjunctivae normal    Pulmonary:      Effort: Pulmonary effort is normal  No respiratory distress  Musculoskeletal:      Cervical back: Normal range of motion  Skin:     General: Skin is warm and dry  Neurological:      General: No focal deficit present  Mental Status: She is alert     Psychiatric:         Mood and Affect: Mood normal  Behavior: Behavior normal          The history was obtained from the review of the chart, patient  Lab Results:   Component      Latest Ref Rng & Units 3/7/2023   Sodium      135 - 147 mmol/L 139   Potassium      3 5 - 5 3 mmol/L 4 1   Chloride      96 - 108 mmol/L 106   CO2      21 - 32 mmol/L 26   Anion Gap      4 - 13 mmol/L 7   BUN      5 - 25 mg/dL 15   Creatinine      0 60 - 1 30 mg/dL 0 58 (L)   GLUCOSE FASTING      65 - 99 mg/dL 97   Calcium      8 3 - 10 1 mg/dL 8 6   AST      5 - 45 U/L 14   ALT      12 - 78 U/L 21   Alkaline Phosphatase      46 - 116 U/L 49   Total Protein      6 4 - 8 4 g/dL 6 7   Albumin      3 5 - 5 0 g/dL 3 7   TOTAL BILIRUBIN      0 20 - 1 00 mg/dL 0 36   eGFR      ml/min/1 73sq m 100   Cholesterol      See Comment mg/dL 217 (H)   Triglycerides      See Comment mg/dL 57   HDL      >=50 mg/dL 103   LDL Calculated      0 - 100 mg/dL 103 (H)   Non-HDL Cholesterol      mg/dl 114   TSH 3RD GENERATON      0 450 - 4 500 uIU/mL 1 760   Vit D, 25-Hydroxy      30 0 - 100 0 ng/mL 38 6   PARATHYROID HORMONE      18 4 - 80 1 pg/mL 81 0 (H)   Free T4      0 76 - 1 46 ng/dL 1 09   Phosphorus      2 3 - 4 1 mg/dL 4 1       Lab Results   Component Value Date/Time    TSH 3RD GENERATON 1 760 03/07/2023 07:51 AM    Free T4 1 09 03/07/2023 07:51 AM         Imaging Studies:   Results for orders placed during the hospital encounter of 10/29/22    US thyroid    Impression  No nodule meets current ACR criteria for requiring biopsy but followup ultrasound is recommended in 1 year  Reference: ACR Thyroid Imaging, Reporting and Data System (TI-RADS): White Paper of the [employer]  J AM Gloria Radiol 5082;14:778-623  (additional recommendations based on American Thyroid Association 2015 guidelines )      Workstation performed: [id number]        I have personally reviewed pertinent reports  Portions of the record may have been created with voice recognition software   Occasional wrong word or "sound a like" substitutions may have occurred due to the inherent limitations of voice recognition software  Read the chart carefully and recognize, using context, where substitutions have occurred

## 2023-03-13 NOTE — PATIENT INSTRUCTIONS
Take your thyroid medication in the morning  Take calcium (caltrate 600 mg) supplementation with lunch and supper  Levothyroxine should be taken 30 minutes before a meal, since dietary fiber and soy products interfere with its absorption  It should not be taken together with medications that inhibit its absorption including calcium or iron supplements, cholestyramine, sucralfate, and aluminum hydroxide  Avoid Calcium supplements, iron supplements, multivitamins or soy products within 4 hours of taking your thyroid hormone pill  These can interfere with absorption of thyroid hormone        XR of thoracolumbar spine at earliest convenience    Labs and thyroid US in 6-months

## 2023-05-08 ENCOUNTER — OFFICE VISIT (OUTPATIENT)
Dept: INTERNAL MEDICINE CLINIC | Facility: CLINIC | Age: 63
End: 2023-05-08

## 2023-05-08 VITALS
DIASTOLIC BLOOD PRESSURE: 70 MMHG | HEART RATE: 86 BPM | OXYGEN SATURATION: 96 % | SYSTOLIC BLOOD PRESSURE: 120 MMHG | BODY MASS INDEX: 25.44 KG/M2 | WEIGHT: 149 LBS | HEIGHT: 64 IN

## 2023-05-08 DIAGNOSIS — E04.1 THYROID NODULE: Primary | ICD-10-CM

## 2023-05-08 DIAGNOSIS — M81.0 AGE-RELATED OSTEOPOROSIS WITHOUT CURRENT PATHOLOGICAL FRACTURE: ICD-10-CM

## 2023-05-08 DIAGNOSIS — E03.9 ACQUIRED HYPOTHYROIDISM: ICD-10-CM

## 2023-05-08 DIAGNOSIS — R91.1 LUNG NODULE: ICD-10-CM

## 2023-05-08 RX ORDER — LEVOTHYROXINE SODIUM 0.07 MG/1
75 TABLET ORAL
Qty: 90 TABLET | Refills: 1 | Status: SHIPPED | OUTPATIENT
Start: 2023-05-08

## 2023-05-08 NOTE — ASSESSMENT & PLAN NOTE
Endocrinologist ordered ultrasound to be done on 9/23/202    10-: US of thyroid:  left sided N1 TR3 1 6 cm  Last US 10/29/2022

## 2023-05-08 NOTE — ASSESSMENT & PLAN NOTE
Patient with history of severe osteoporosis of lumbar spine  She has history of wrist fracture and a possible occult fracture of the coccyx based on recent XR imaging  Patient hesitant to pursue pharmacologic treatment of osteoporosis, preferring strategy of optimization of diet and activity, in addition to calcium and vitamin D supplementation  Labs showed mild elevation of PTH w    Pt was advised( calcium on low end of normal, normal vitamin D 25-OH)increasing calcium carbonate 600 mg twice daily (may take with lunch and supper, avoid within 4-hours of thyroid medication)      Endo ordered thoraco-lumbar spine xray

## 2023-05-08 NOTE — PROGRESS NOTES
Name: Justin Madrigal      : 1960      MRN: [id number]  Encounter Provider: Ingrid Huffman MD  Encounter Date: 2023   Encounter department: [address]     1  Thyroid nodule  Assessment & Plan:  Endocrinologist ordered ultrasound to be done on 9/23/202    10-: US of thyroid:  left sided N1 TR3 1 6 cm  Last US 10/29/2022        2  Age-related osteoporosis without current pathological fracture  Assessment & Plan:  Patient with history of severe osteoporosis of lumbar spine  She has history of wrist fracture and a possible occult fracture of the coccyx based on recent XR imaging  Patient hesitant to pursue pharmacologic treatment of osteoporosis, preferring strategy of optimization of diet and activity, in addition to calcium and vitamin D supplementation  Labs showed mild elevation of PTH w    Pt was advised( calcium on low end of normal, normal vitamin D 25-OH)increasing calcium carbonate 600 mg twice daily (may take with lunch and supper, avoid within 4-hours of thyroid medication)  Endo ordered thoraco-lumbar spine xray      3  Acquired hypothyroidism  Assessment & Plan:  Lab Results   Component Value Date    [id number] 1 760 2023    TSH 1 300 2023       No complains as it relates to thyroid    Current med Levothryoxine 75 mcg daily    Will continue same      Orders:  -     levothyroxine 75 mcg tablet; Take 1 tablet (75 mcg total) by mouth daily in the early morning    4  Lung nodule  Assessment & Plan:  Lung nodule: Incidental finding during workup for the palpitations possibly on the chest x-ray sometime in 2022:  CT scan of the chest:  6 mm nodule at the base of the left lower lobe at costophrenic angle  Peripheral interstitial opacities in the bilateral lower lung zone suggestive of mild fibrotic changes  Other than that CT scan of the chest was unremarkable    10/07/2022:  Surveillance follow-up CT scan of the chest:  1 cm nodule in the left lobe of the thyroid gland incidental finding, unchanged 6 mm subpleural left lower lobe nodule, degenerative changes of the spine, hepatics cyst-too small to characterize  Remote history of smoking quit 30 years ago, patient started smoking at age 14 and quit at age 29  Patient smoke a pack a day for altogether 14 years that is 7 pack year     Will do CT Scan of chest for surveillance of 6 mm nodule    Orders:  -     CT chest wo contrast; Future; Expected date: 09/11/2023           Subjective     Here for follow up    No complains    Hypothyroid, and thyroid nodule: going for TSH and US in September and has appoitnment with ondina flores, currently on 75 mcg daiy    Midly elevated pth and slightly low calcium : for supplement and follow up labs in September    Osteoporosis: no meds per her chice    Mammogram cat 2     Lung nodule no sx; pt did not do in aprila advised will order in September with US          Lung nodule: Incidental finding during workup for the palpitations possibly on the chest x-ray sometime in March 2022 03/31/2022:  CT scan of the chest:  6 mm nodule at the base of the left lower lobe at costophrenic angle  Peripheral interstitial opacities in the bilateral lower lung zone suggestive of mild fibrotic changes  Other than that CT scan of the chest was unremarkable  10/07/2022:  Surveillance follow-up CT scan of the chest:  1 cm nodule in the left lobe of the thyroid gland incidental finding, unchanged 6 mm subpleural left lower lobe nodule, degenerative changes of the spine, hepatics cyst-too small to characterize  Remote history of smoking quit 30 years ago, patient started smoking at age 14 and quit at age 29  Patient smoke a pack a day for altogether 14 years that is 7 pack year       10/28/2022: Blood sugar 102      Thyroid nodule:   Incidental finding of thyroid nodule during CT scan of the chest as a part of the surveillance 6 mm subpleural left lower lobe nodule done on 10/07/2022:  10/07/2022:  CT scan of the chest revealed likely 1 cm nodule within the left lobe of the thyroid gland  Thyroid nodule without suspicious feature 1-1 4 cm in patient's of 35 year or greater  According to them no further workup is recommended  Patient is being scheduled for thyroid ultrasound by by endocrinologist   Patient will call and set up her own appointment      Osteoporosis:  Patient had a DEXA scan done on 03/31/2022:  Lumbar spine T-score-3 3:, left femoral neck T-score-1 4, left total hip T-score -0 7; this is the 1st DEXA scan  He patient is family had a history of adverse effect to the osteoporosis medications patient like to try nonpharmacological treatment  Patient likes her gynecologist and will be doing follow-up DEX scan back in 2 years            Review of Systems   Constitutional: Negative for chills, fatigue and fever  HENT: Negative for ear pain and sore throat  Eyes: Negative for pain and visual disturbance  Respiratory: Negative for cough and shortness of breath  Cardiovascular: Negative for chest pain and palpitations  Gastrointestinal: Negative for abdominal pain, constipation, diarrhea, nausea and vomiting  Endocrine: Negative for polydipsia  Genitourinary: Negative for difficulty urinating, dysuria and hematuria  Musculoskeletal: Negative for arthralgias, back pain and myalgias  Skin: Negative for color change and rash  Neurological: Negative for dizziness, seizures, syncope and headaches  Psychiatric/Behavioral: Negative for agitation, confusion and hallucinations  All other systems reviewed and are negative        Past Medical History:   Diagnosis Date   • Allergic    • Arthritis    • COVID-19    • Disease of thyroid gland 2012    [place] 90 mg and Synthyroid 25 mcg   • Hypothyroidism 2021   • Osteoporosis      Past Surgical History:   Procedure Laterality Date   • CYST REMOVAL      From foot   • FOOT "SURGERY      cyst removed, top of foot   • TONSILLECTOMY       Family History   Problem Relation Age of Onset   • Osteoporosis Mother    • Hypothyroidism Mother      Social History     Socioeconomic History   • Marital status: Single     Spouse name: None   • Number of children: None   • Years of education: None   • Highest education level: None   Occupational History   • None   Tobacco Use   • Smoking status: Former     Packs/day: 0 50     Years: 10 00     Pack years: 5 00     Types: Cigarettes     Start date: 1978     Quit date: 1988     Years since quittin 3   • Smokeless tobacco: Never   • Tobacco comments:     quit over 30 years ago   Vaping Use   • Vaping Use: Never used   Substance and Sexual Activity   • Alcohol use:  Yes     Alcohol/week: 3 0 standard drinks     Types: 3 Glasses of wine per week     Comment: occ   • Drug use: Never   • Sexual activity: Not Currently     Partners: Male     Birth control/protection: Post-menopausal   Other Topics Concern   • None   Social History Narrative   • None     Social Determinants of Health     Financial Resource Strain: Not on file   Food Insecurity: Not on file   Transportation Needs: Not on file   Physical Activity: Not on file   Stress: Not on file   Social Connections: Not on file   Intimate Partner Violence: Not on file   Housing Stability: Not on file     Current Outpatient Medications on File Prior to Visit   Medication Sig   • [DISCONTINUED] levothyroxine 75 mcg tablet Take 1 tablet (75 mcg total) by mouth daily in the early morning     Allergies   Allergen Reactions   • Codeine Other (See Comments)     hypersensitivity     Immunization History   Administered Date(s) Administered   • COVID-19 MODERNA VACC 0 25 ML IM BOOSTER 2021   • COVID-19 MODERNA VACC 0 5 ML IM 2021, 2021, 2021   • COVID-19 Moderna Vac BIVALENT 12 Yr+ IM (BOOSTER ONLY) 0 5 ML 2022       Objective     /70   Pulse 86   Ht 5' 4\" (1 626 m) " Wt 67 6 kg (149 lb)   SpO2 96%   BMI 25 58 kg/m²     Physical Exam  Constitutional:       Appearance: Normal appearance  She is well-developed  She is not ill-appearing  HENT:      Head: Normocephalic and atraumatic  Right Ear: External ear normal       Left Ear: External ear normal       Nose: Nose normal  No congestion or rhinorrhea  Mouth/Throat:      Pharynx: Oropharynx is clear  No oropharyngeal exudate or posterior oropharyngeal erythema  Eyes:      Conjunctiva/sclera: Conjunctivae normal    Neck:      Thyroid: No thyromegaly  Vascular: No JVD  Cardiovascular:      Rate and Rhythm: Normal rate and regular rhythm  Pulses: Normal pulses  Heart sounds: Normal heart sounds  No murmur heard  Pulmonary:      Effort: Pulmonary effort is normal       Breath sounds: Normal breath sounds  No wheezing or rales  Chest:      Chest wall: No tenderness  Abdominal:      General: Bowel sounds are normal  There is no distension  Tenderness: There is no abdominal tenderness  Musculoskeletal:         General: No deformity  Normal range of motion  Cervical back: Normal range of motion  Right lower leg: No edema  Left lower leg: No edema  Comments: Is first finger  4 to 5 mm ganglionic or cystic cyst present between the DIP and nail   Skin:     Coloration: Skin is not pale  Findings: No erythema or rash  Neurological:      Mental Status: She is alert and oriented to person, place, and time  Sensory: No sensory deficit  Motor: No weakness        Coordination: Coordination normal       Gait: Gait normal    Psychiatric:         Mood and Affect: Mood normal          Judgment: Judgment normal        Ingrid Huffman MD

## 2023-05-08 NOTE — ASSESSMENT & PLAN NOTE
Lung nodule: Incidental finding during workup for the palpitations possibly on the chest x-ray sometime in March 2022 03/31/2022:  CT scan of the chest:  6 mm nodule at the base of the left lower lobe at costophrenic angle  Peripheral interstitial opacities in the bilateral lower lung zone suggestive of mild fibrotic changes  Other than that CT scan of the chest was unremarkable  10/07/2022:  Surveillance follow-up CT scan of the chest:  1 cm nodule in the left lobe of the thyroid gland incidental finding, unchanged 6 mm subpleural left lower lobe nodule, degenerative changes of the spine, hepatics cyst-too small to characterize  Remote history of smoking quit 30 years ago, patient started smoking at age 14 and quit at age 29   Patient smoke a pack a day for altogether 14 years that is 7 pack year     Will do CT Scan of chest for surveillance of 6 mm nodule

## 2023-05-08 NOTE — ASSESSMENT & PLAN NOTE
Lab Results   Component Value Date    [id number] 1 760 03/07/2023    TSH 1 300 01/05/2023       No complains as it relates to thyroid    Current med Levothryoxine 75 mcg daily    Will continue same

## 2023-05-08 NOTE — PATIENT INSTRUCTIONS
Go for  thoraco-lumbar spine xray at your convience- No appointment is  necessary    Schedule CT Scan of chest in September for surveillance of your 6 mm lung nodule    Also your endocrinologist ordered US of thyroid and blood test  Do in first week of September before you endocrinologist appointment

## 2023-05-16 ENCOUNTER — OFFICE VISIT (OUTPATIENT)
Dept: PODIATRY | Facility: CLINIC | Age: 63
End: 2023-05-16

## 2023-05-16 VITALS — WEIGHT: 149 LBS | HEIGHT: 64 IN | RESPIRATION RATE: 17 BRPM | BODY MASS INDEX: 25.44 KG/M2

## 2023-05-16 DIAGNOSIS — M21.961 ACQUIRED DEFORMITY OF RIGHT FOOT: ICD-10-CM

## 2023-05-16 DIAGNOSIS — M67.40 MUCOID CYST OF JOINT: Primary | ICD-10-CM

## 2023-05-16 DIAGNOSIS — B35.9 DERMATOPHYTOSIS: ICD-10-CM

## 2023-05-16 RX ORDER — CLOTRIMAZOLE AND BETAMETHASONE DIPROPIONATE 10; .64 MG/G; MG/G
CREAM TOPICAL 2 TIMES DAILY
Qty: 45 G | Refills: 1 | Status: SHIPPED | OUTPATIENT
Start: 2023-05-16 | End: 2023-06-10

## 2023-05-16 NOTE — PROGRESS NOTES
Assessment/Plan:   pain upon ambulation   Mucoid cyst 4th right  toe  Acquired deformity of foot  Dermatophytosis       Plan   Foot exam performed   Patient educated on condition   Right 4th toe lesion debrided   Phenol applied     Bandage applied   Patient may need surgical excision of lesion  In addition we will add topical antifungal to help with dermatophytosis         Diagnoses and all orders for this visit:     Mucoid cyst of joint , 4th right toe     Onychomycosis      acquired deformity of foot bilateral                Subjective:   Patient presents for evaluation   She is doing better  Britta Debra is taking Lamisil without problem   She has a small cyst developing on her 4th right toe      Patient is also concerned with dry scaly skin of her heels bilateral     No Known Allergies        Current Outpatient Medications:   •  [place] Thyroid 90 MG tablet, , Disp: , Rfl:   •  levothyroxine (Synthroid) 137 mcg tablet, Take by mouth, Disp: , Rfl:   •  terbinafine (LamISIL) 250 mg tablet, Take 1 tablet (250 mg total) by mouth daily, Disp: 30 tablet, Rfl: 0     There is no problem list on file for this patient             Patient ID: Florina Storm is a 62 y o  female      HPI     The following portions of the patient's history were reviewed and updated as appropriate:      family history is not on file        has no history on file for tobacco, alcohol, and drug         Objective:  Patient's shoes removed  By patient         Foot ExamPhysical Exam          General  General Appearance: appears stated age and healthy   Orientation: alert and oriented to person, place, and time   Affect: appropriate   Gait: unimpaired         Right Foot/Ankle      Inspection and Palpation  Swelling: dorsum   Arch: pes planus  Hallux valgus: yes     Neurovascular  Dorsalis pedis: 3+  Posterior tibial: 3+        Left Foot/Ankle       Inspection and Palpation  Tenderness: bony tenderness   Swelling: dorsum   Arch: pes planus  Hallux valgus: yes  Skin Exam: skin changes;      Neurovascular  Dorsalis pedis: 3+  Posterior tibial: 3+           Physical Exam  Vitals signs and nursing note reviewed  Constitutional:       Appearance: Normal appearance  Cardiovascular:      Rate and Rhythm: Normal rate and regular rhythm       Pulses:           Dorsalis pedis pulses are 3+ on the right side and 3+ on the left side         Posterior tibial pulses are 3+ on the right side and 3+ on the left side  Musculoskeletal:      Right foot: Bunion present       Left foot: Bony tenderness and bunion present  Skin:     Comments:      DIPJ 4th right toe demonstrates small mucoid cyst   Incision and drainage done   Gel noted   No evidence of infection        Neurological:      Mental Status: She is alert  Psychiatric:         Mood and Affect: Mood normal          BehaviorVerdene Gaster         Thought Content:  Thought content normal          Judgment: Judgment normal

## 2023-05-30 ENCOUNTER — OFFICE VISIT (OUTPATIENT)
Dept: URGENT CARE | Facility: CLINIC | Age: 63
End: 2023-05-30

## 2023-05-30 ENCOUNTER — APPOINTMENT (OUTPATIENT)
Dept: RADIOLOGY | Facility: CLINIC | Age: 63
End: 2023-05-30

## 2023-05-30 VITALS
WEIGHT: 149.2 LBS | RESPIRATION RATE: 18 BRPM | DIASTOLIC BLOOD PRESSURE: 62 MMHG | TEMPERATURE: 99.2 F | BODY MASS INDEX: 23.98 KG/M2 | SYSTOLIC BLOOD PRESSURE: 102 MMHG | HEART RATE: 88 BPM | HEIGHT: 66 IN | OXYGEN SATURATION: 98 %

## 2023-05-30 DIAGNOSIS — S09.92XA NASAL INJURY, INITIAL ENCOUNTER: ICD-10-CM

## 2023-05-30 DIAGNOSIS — S09.92XA NASAL INJURY, INITIAL ENCOUNTER: Primary | ICD-10-CM

## 2023-05-30 RX ORDER — IBUPROFEN 200 MG
1 CAPSULE ORAL 2 TIMES DAILY
COMMUNITY

## 2023-05-30 NOTE — PATIENT INSTRUCTIONS
No fracture seen per my read  I will follow-up with the official radiology report  Continue to ice the nose

## 2023-05-30 NOTE — PROGRESS NOTES
[facility]        NAME: Zahida See is a 63 y o  female  : 1960    MRN: [id number]  DATE: May 30, 2023  TIME: 5:15 PM    Assessment and Plan   Nasal injury, initial encounter [[id number] [id number]]  1  Nasal injury, initial encounter  XR nasal bones            Patient Instructions   Patient Instructions   No fracture seen per my read  I will follow-up with the official radiology report  Continue to ice the nose  Follow up with PCP in 3-5 days  Proceed to  ER if symptoms worsen  Chief Complaint     Chief Complaint   Patient presents with   • Nasal Injury     Today - 121lb dog greeted her and her her in the nose with his head  Has tiny scab on L side of nose - bled slightly  Has swelling top of nose but no pain or epistaxis  Jeremy Dill History of Present Illness       Patient is a 27-year-old female presenting today for nasal pain  [place] 120 pound dog saw her he jumped up and hit her in the nose  She does have a tiny scab on left side of her nose  She reports swelling but no ecchymosis  Tdap is up-to-date  Review of Systems   Review of Systems   Musculoskeletal: Negative for arthralgias  Skin: Positive for wound  Negative for color change           Current Medications       Current Outpatient Medications:   •  calcium carbonate (OS-ELAINA) 1250 (500 Ca) MG tablet, Take 1 tablet by mouth 2 (two) times a day, Disp: , Rfl:   •  Cholecalciferol (Vitamin D-3) 125 MCG (5000 UT) TABS, Take by mouth daily after breakfast, Disp: , Rfl:   •  clotrimazole-betamethasone (LOTRISONE) 1-0 05 % cream, Apply topically 2 (two) times a day for 25 days, Disp: 45 g, Rfl: 1  •  levothyroxine 75 mcg tablet, Take 1 tablet (75 mcg total) by mouth daily in the early morning, Disp: 90 tablet, Rfl: 1    Current Allergies     Allergies as of 2023 - Reviewed 2023   Allergen Reaction Noted   • Codeine Other (See Comments) 10/12/2022            The following "portions of the patient's history were reviewed and updated as appropriate: allergies, current medications, past family history, past medical history, past social history, past surgical history and problem list      Past Medical History:   Diagnosis Date   • Allergic    • Arthritis    • COVID-19    • Disease of thyroid gland 2012    [place] 90 mg and Synthyroid 25 mcg   • Hypothyroidism 2021   • Osteoporosis        Past Surgical History:   Procedure Laterality Date   • CYST REMOVAL      From foot   • FOOT SURGERY  1972    cyst removed, top of foot   • TONSILLECTOMY         Family History   Problem Relation Age of Onset   • Osteoporosis Mother    • Hypothyroidism Mother          Medications have been verified  Objective   /62   Pulse 88   Temp 99 2 °F (37 3 °C)   Resp 18   Ht 5' 5 5\" (1 664 m)   Wt 67 7 kg (149 lb 3 2 oz)   SpO2 98%   BMI 24 45 kg/m²        Physical Exam     Physical Exam  Vitals and nursing note reviewed  Constitutional:       General: She is not in acute distress  Appearance: Normal appearance  She is normal weight  She is not ill-appearing, toxic-appearing or diaphoretic  HENT:      Nose: Laceration (small wound on the left side) present  No nasal deformity, septal deviation or nasal tenderness  Right Nostril: No foreign body, epistaxis or septal hematoma  Left Nostril: No foreign body, epistaxis or septal hematoma  Right Sinus: No maxillary sinus tenderness or frontal sinus tenderness  Left Sinus: No maxillary sinus tenderness or frontal sinus tenderness  Neurological:      Mental Status: She is alert                     "

## 2023-06-11 ENCOUNTER — TELEPHONE (OUTPATIENT)
Dept: OTHER | Facility: OTHER | Age: 63
End: 2023-06-11

## 2023-06-12 NOTE — TELEPHONE ENCOUNTER
Patient is calling regarding cancelling an appointment      Date/Time: 09/13/23 2pm    Patient was rescheduled: YES [] NO [x]    Patient requesting call back to reschedule: YES [x] NO []

## 2023-07-27 ENCOUNTER — OFFICE VISIT (OUTPATIENT)
Dept: PODIATRY | Facility: CLINIC | Age: 63
End: 2023-07-27
Payer: COMMERCIAL

## 2023-07-27 VITALS
BODY MASS INDEX: 23.95 KG/M2 | SYSTOLIC BLOOD PRESSURE: 102 MMHG | WEIGHT: 149 LBS | HEIGHT: 66 IN | RESPIRATION RATE: 18 BRPM | DIASTOLIC BLOOD PRESSURE: 62 MMHG

## 2023-07-27 DIAGNOSIS — M67.40 MUCOID CYST OF JOINT: Primary | ICD-10-CM

## 2023-07-27 DIAGNOSIS — B35.9 DERMATOPHYTOSIS: ICD-10-CM

## 2023-07-27 DIAGNOSIS — M21.962 ACQUIRED DEFORMITY OF LEFT FOOT: ICD-10-CM

## 2023-07-27 DIAGNOSIS — M21.961 ACQUIRED DEFORMITY OF RIGHT FOOT: ICD-10-CM

## 2023-07-27 PROCEDURE — 99213 OFFICE O/P EST LOW 20 MIN: CPT | Performed by: PODIATRIST

## 2023-07-27 RX ORDER — CLOTRIMAZOLE AND BETAMETHASONE DIPROPIONATE 10; .64 MG/G; MG/G
CREAM TOPICAL 2 TIMES DAILY
Qty: 45 G | Refills: 1 | Status: SHIPPED | OUTPATIENT
Start: 2023-07-27 | End: 2023-08-21

## 2023-07-27 NOTE — PROGRESS NOTES
Assessment/Plan:   pain upon ambulation.  Mucoid cyst 4th right  toe.  Acquired deformity of foot. Dermatophytosis.      Plan.   Chart reviewed.  Foot exam performed.  Patient educated on condition.  Right 4th toe lesion debrided.  Phenol applied.    Bandage applied.  Patient may need surgical excision of lesion.     In addition we will add topical antifungal to help with dermatophytosis         Diagnoses and all orders for this visit:     Mucoid cyst of joint , 4th right toe     Onychomycosis      acquired deformity of foot bilateral.               Subjective:   Patient presents for evaluation.  She is doing better. Jessica Hills is taking Lamisil without problem.  She has a small cyst developing on her 4th right toe.     Patient is also concerned with dry scaly skin of her heels bilateral     No Known Allergies        Current Outpatient Medications:   •  [place] Thyroid 90 MG tablet, , Disp: , Rfl:   •  levothyroxine (Synthroid) 137 mcg tablet, Take by mouth, Disp: , Rfl:   •  terbinafine (LamISIL) 250 mg tablet, Take 1 tablet (250 mg total) by mouth daily, Disp: 30 tablet, Rfl: 0              Patient ID: Florina Storm is a 62 y.o. female.     HPI     The following portions of the patient's history were reviewed and updated as appropriate:      family history is not on file.       has no history on file for tobacco, alcohol, and drug.        Objective:  Patient's shoes removed  By patient     .   Foot ExamPhysical Exam          General  General Appearance: appears stated age and healthy   Orientation: alert and oriented to person, place, and time   Affect: appropriate   Gait: unimpaired         Right Foot/Ankle      Inspection and Palpation  Swelling: dorsum   Arch: pes planus  Hallux valgus: yes     Neurovascular  Dorsalis pedis: 3+  Posterior tibial: 3+        Left Foot/Ankle       Inspection and Palpation  Tenderness: bony tenderness   Swelling: dorsum   Arch: pes planus  Hallux valgus: yes  Skin Exam: skin changes;    Neurovascular  Dorsalis pedis: 3+  Posterior tibial: 3+           Physical Exam  Vitals signs and nursing note reviewed. Constitutional:       Appearance: Normal appearance. Cardiovascular:      Rate and Rhythm: Normal rate and regular rhythm.      Pulses:           Dorsalis pedis pulses are 3+ on the right side and 3+ on the left side.        Posterior tibial pulses are 3+ on the right side and 3+ on the left side. Musculoskeletal:      Right foot: Bunion present.      Left foot: Bony tenderness and bunion present. Skin:     Comments:      DIPJ 4th right toe demonstrates small mucoid cyst.  Incision and drainage done.  Gel noted.  No evidence of infection.       Neurological:      Mental Status: She is alert. Psychiatric:         Mood and Affect: Mood normal.         BehaviorTana Ban         Thought Content:  Thought content normal.         Judgment: Judgment normal.

## 2023-08-31 ENCOUNTER — OFFICE VISIT (OUTPATIENT)
Dept: ENDOCRINOLOGY | Facility: CLINIC | Age: 63
End: 2023-08-31
Payer: COMMERCIAL

## 2023-08-31 VITALS — WEIGHT: 146.2 LBS | HEIGHT: 65 IN | BODY MASS INDEX: 24.36 KG/M2

## 2023-08-31 DIAGNOSIS — E04.1 THYROID NODULE: ICD-10-CM

## 2023-08-31 DIAGNOSIS — E03.9 ACQUIRED HYPOTHYROIDISM: Primary | ICD-10-CM

## 2023-08-31 DIAGNOSIS — E55.9 VITAMIN D DEFICIENCY: ICD-10-CM

## 2023-08-31 DIAGNOSIS — M81.0 OSTEOPOROSIS, UNSPECIFIED OSTEOPOROSIS TYPE, UNSPECIFIED PATHOLOGICAL FRACTURE PRESENCE: ICD-10-CM

## 2023-08-31 PROCEDURE — 99214 OFFICE O/P EST MOD 30 MIN: CPT | Performed by: INTERNAL MEDICINE

## 2023-08-31 RX ORDER — LEVOTHYROXINE SODIUM 0.07 MG/1
75 TABLET ORAL
Qty: 90 TABLET | Refills: 3 | Status: SHIPPED | OUTPATIENT
Start: 2023-08-31

## 2023-08-31 NOTE — PATIENT INSTRUCTIONS
Continue current regimen  Repeat labs in 6 months, earlier if you have any concerning symptoms    Goals for osteoporosis   Vitamin D  Goal > 30  Calcium intake ~ 4104-7496 mg/day  Weight bearing exercises as tolerated        A Guide to Calcium-Rich Foods  We all know that milk is a great source of calcium, but you may be surprised by all the different foods you can work into your diet to reach your daily recommended amount of calcium.  Use the guide below to get ideas of additional calcium-rich foods to add to your weekly shopping list.  Produce Serving Size Estimated Calcium*   Wil greens, frozen 8 oz 360 mg   Broccoli donovan 8 oz 200 mg   Kale, frozen 8 oz 180 mg   Soy Beans, green, boiled 8 oz 175 mg   Bok [place], cooked, boiled 8 oz 160 mg   Figs, dried 2 figs 65 mg   Broccoli, fresh, cooked 8 oz 60 mg   Oranges 1 whole 55 mg   Seafood Serving Size Estimated Calcium*   Sardines, canned with bones 3 oz 325 mg   [place], canned with bones 3 oz 180 mg   Shrimp, canned 3 oz 125 mg   Dairy Serving Size Estimated Calcium*   Ricotta, part-skim 4 oz 335 mg   Yogurt, plain, low-fat 6 oz 310 mg   Milk, skim, low-fat, whole 8 oz 300 mg   Yogurt with fruit, low-fat 6 oz 260 mg   Mozzarella, part-skim 1 oz 210 mg   Cheddar 1 oz 205 mg   Yogurt, Greek 6 oz 200 mg   American Cheese 1 oz 195 mg   Feta Cheese 4 oz 140 mg   Cottage Cheese, 2% 4 oz 105 mg   Frozen yogurt, vanilla 8 oz 105 mg   Ice Cream, vanilla 8 oz 85 mg   Parmesan 1 tbsp 55 mg   Fortified Food Serving Size Estimated Calcium*   [place] milk, rice milk or soy milk, fortified 8 oz 300 mg   Orange juice and other fruit juices, fortified 8 oz 300 mg   Tofu, prepared with calcium 4 oz 205 mg   Waffle, frozen, fortified 2 pieces 200 mg   Oatmeal, fortified 1 packet 140 mg   English muffin, fortified 1 muffin 100 mg   Cereal, fortified 8 oz 100-1,000 mg   Other Serving Size Estimated Calcium*   Mac & cheese, frozen 1 package 325 mg   Pizza, cheese, frozen 1 serving 115 mg Pudding, chocolate, prepared with 2% milk 4 oz 160 mg   Beans, baked, canned 4 oz 160 mg   *The calcium content listed for most foods is estimated and can vary due to multiple factors. Check the food label to determine how much calcium is in a particular product.

## 2023-08-31 NOTE — PROGRESS NOTES
Dwayne Hester 63 y.o. female MRN: [id number]    Encounter: [id number]      Assessment/Plan     1. Hypothyroidism  Clinically, biochemically euthyroid  Continue current dose of levothyroxine  Repeat labs in 6 months, earlier if patient has any concerning symptoms    2. Thyroid nodule-repeat thyroid ultrasound scheduled    3. Osteoporosis, history of fractures  4. History of vitamin D deficiency  Continue calcium, vitamin D supplementation, ensure adequate calcium intake  Repeat DEXA scan in 3/2024    Goals for osteoporosis   Vitamin D  Goal > 30  Calcium intake ~ 5190-2774 mg/day  Weight bearing exercises as tolerated      CC: Hypothyroidism, thyroid nodule, osteoporosis    History of Present Illness     HPI:  Dwayne Hester is a 63 y.o. female who is here for a follow-up of hypothyroidism, osteoporosis    Last seen in 3/2023, Dr Sonia Wilson    For hypothyroidism, currently taking levothyroxine 75 mcg orally daily  Compliant, takes on an empty stomach  Denies symptoms of hyper or hypothyroidism  Overall feels well, does not have any complaints    History of thyroid nodule-scheduled to have ultrasound thyroid on 9/13  Does not have any swelling in the neck, no obstructive symptoms    Osteoporosis  Taking calcium 600 mg mostly once a day   Diet:not a lot of dairy since her mother was diagnosed with lactose intolerance   D3 5000 IU approx 2 times a week   Walks her dog, strength training three times a week   Last DEXA scan 3/2022       All other systems were reviewed and are negative.        Review of Systems    Historical Information   Past Medical History:   Diagnosis Date   • Allergic    • Arthritis    • COVID-19    • Disease of thyroid gland 2012    [place] 90 mg and Synthyroid 25 mcg   • Hypothyroidism 2021   • Osteoporosis      Past Surgical History:   Procedure Laterality Date   • CYST REMOVAL      From foot   • FOOT SURGERY  1972    cyst removed, top of foot   • TONSILLECTOMY       Social History   Social History Substance and Sexual Activity   Alcohol Use Yes   • Alcohol/week: 3.0 standard drinks of alcohol   • Types: 3 Glasses of wine per week    Comment: occ     Social History     Substance and Sexual Activity   Drug Use Never     Social History     Tobacco Use   Smoking Status Former   • Packs/day: 0.50   • Years: 10.00   • Total pack years: 5.00   • Types: Cigarettes   • Start date: 1978   • Quit date: 1988   • Years since quittin.6   Smokeless Tobacco Never   Tobacco Comments    quit over 30 years ago     Family History:   Family History   Problem Relation Age of Onset   • Osteoporosis Mother    • Hypothyroidism Mother        Meds/Allergies   Current Outpatient Medications   Medication Sig Dispense Refill   • calcium carbonate (OS-ELAINA) 1250 (500 Ca) MG tablet Take 1 tablet by mouth 2 (two) times a day     • Cholecalciferol (Vitamin D-3) 125 MCG (5000 UT) TABS Take by mouth daily after breakfast     • clotrimazole-betamethasone (LOTRISONE) 1-0.05 % cream Apply topically 2 (two) times a day for 25 days 45 g 1   • levothyroxine 75 mcg tablet Take 1 tablet (75 mcg total) by mouth daily in the early morning 90 tablet 1     No current facility-administered medications for this visit. Allergies   Allergen Reactions   • Codeine Other (See Comments)     hypersensitivity       Objective   Vitals: Height 5' 5" (1.651 m), weight 66.3 kg (146 lb 3.2 oz). Physical Exam  Constitutional:       Appearance: She is well-developed. HENT:      Head: Normocephalic and atraumatic. Eyes:      Conjunctiva/sclera: Conjunctivae normal.      Pupils: Pupils are equal, round, and reactive to light. Neck:      Thyroid: No thyromegaly. Cardiovascular:      Rate and Rhythm: Normal rate and regular rhythm. Heart sounds: Normal heart sounds. No murmur heard. Pulmonary:      Effort: Pulmonary effort is normal.      Breath sounds: Normal breath sounds. No wheezing. Abdominal:      General: There is no distension. Palpations: Abdomen is soft. Tenderness: There is no abdominal tenderness. Musculoskeletal:         General: Normal range of motion. Cervical back: Normal range of motion and neck supple. Skin:     General: Skin is warm and dry. Neurological:      Mental Status: She is alert and oriented to person, place, and time. Deep Tendon Reflexes: Reflexes normal.      Comments: No tremors on the outstretched arms    Psychiatric:         Behavior: Behavior normal.         The history was obtained from the review of the chart, patient. Lab Results:   Lab Results   Component Value Date/Time    TSH 3RD GENERATON 1.760 03/07/2023 07:51 AM    Free T4 1.09 03/07/2023 07:51 AM     No results found for: "WBC", "HGB", "HCT", "MCV", "PLT"  Lab Results   Component Value Date    CREATININE 0.58 (L) 03/07/2023    BUN 15 03/07/2023    K 4.1 03/07/2023     03/07/2023    CO2 26 03/07/2023     No results found for: "HGBA1C"      Imaging Studies:   Results for orders placed during the hospital encounter of 10/29/22    US thyroid    Impression  No nodule meets current ACR criteria for requiring biopsy but followup ultrasound is recommended in 1 year. Reference: ACR Thyroid Imaging, Reporting and Data System (TI-RADS): White Paper of the [employer]. J AM Gloria Radiol 2685;21:501-372. (additional recommendations based on American Thyroid Association 2015 guidelines.)      Workstation performed: [id number]      I have personally reviewed pertinent reports. Portions of the record may have been created with voice recognition software. Occasional wrong word or "sound a like" substitutions may have occurred due to the inherent limitations of voice recognition software. Read the chart carefully and recognize, using context, where substitutions have occurred.

## 2023-09-08 ENCOUNTER — HOSPITAL ENCOUNTER (OUTPATIENT)
Dept: RADIOLOGY | Facility: HOSPITAL | Age: 63
Discharge: HOME | End: 2023-09-08
Attending: STUDENT IN AN ORGANIZED HEALTH CARE EDUCATION/TRAINING PROGRAM
Payer: COMMERCIAL

## 2023-09-08 ENCOUNTER — HOSPITAL ENCOUNTER (OUTPATIENT)
Dept: RADIOLOGY | Facility: HOSPITAL | Age: 63
Discharge: HOME | End: 2023-09-08
Payer: COMMERCIAL

## 2023-09-08 ENCOUNTER — HOSPITAL ENCOUNTER (OUTPATIENT)
Dept: RADIOLOGY | Facility: HOSPITAL | Age: 63
Discharge: HOME | End: 2023-09-08
Attending: INTERNAL MEDICINE
Payer: COMMERCIAL

## 2023-09-08 DIAGNOSIS — R91.1 LUNG NODULE: ICD-10-CM

## 2023-09-08 DIAGNOSIS — M81.0 OSTEOPOROSIS, UNSPECIFIED OSTEOPOROSIS TYPE, UNSPECIFIED PATHOLOGICAL FRACTURE PRESENCE: ICD-10-CM

## 2023-09-08 DIAGNOSIS — E04.1 THYROID NODULE: ICD-10-CM

## 2023-09-08 PROCEDURE — 76536 US EXAM OF HEAD AND NECK: CPT

## 2023-09-08 PROCEDURE — 71250 CT THORAX DX C-: CPT

## 2023-09-08 PROCEDURE — 72080 X-RAY EXAM THORACOLMB 2/> VW: CPT

## 2023-09-08 PROCEDURE — G1004 CDSM NDSC: HCPCS

## 2023-11-09 ENCOUNTER — TELEPHONE (OUTPATIENT)
Dept: OTHER | Facility: OTHER | Age: 63
End: 2023-11-09

## 2024-02-29 ENCOUNTER — TELEPHONE (OUTPATIENT)
Dept: OTHER | Facility: OTHER | Age: 64
End: 2024-02-29

## 2024-02-29 NOTE — TELEPHONE ENCOUNTER
Patient is calling regarding cancelling an appointment.    Date/Time:2/29/2024/ 12:40 PM    Patient was rescheduled: YES [] NO [x]    Patient requesting call back to reschedule: YES [x] NO []    Patient wants virtual appointment